# Patient Record
Sex: FEMALE | Race: WHITE | URBAN - METROPOLITAN AREA
[De-identification: names, ages, dates, MRNs, and addresses within clinical notes are randomized per-mention and may not be internally consistent; named-entity substitution may affect disease eponyms.]

---

## 2018-05-01 ENCOUNTER — APPOINTMENT (OUTPATIENT)
Dept: LAB | Facility: CLINIC | Age: 33
End: 2018-05-01
Attending: OBSTETRICS & GYNECOLOGY
Payer: COMMERCIAL

## 2018-05-01 ENCOUNTER — TRANSFERRED RECORDS (OUTPATIENT)
Dept: HEALTH INFORMATION MANAGEMENT | Facility: CLINIC | Age: 33
End: 2018-05-01

## 2018-05-01 PROBLEM — R11.2 NAUSEA WITH VOMITING: Status: ACTIVE | Noted: 2018-05-01

## 2018-05-01 RX ORDER — DEXTROSE, SODIUM CHLORIDE, SODIUM LACTATE, POTASSIUM CHLORIDE, AND CALCIUM CHLORIDE 5; .6; .31; .03; .02 G/100ML; G/100ML; G/100ML; G/100ML; G/100ML
INJECTION, SOLUTION INTRAVENOUS CONTINUOUS
Status: CANCELLED
Start: 2018-05-02

## 2018-05-01 RX ORDER — ONDANSETRON 2 MG/ML
4 INJECTION INTRAMUSCULAR; INTRAVENOUS ONCE
Status: CANCELLED
Start: 2018-05-02 | End: 2018-05-02

## 2018-05-02 ENCOUNTER — HOSPITAL ENCOUNTER (OUTPATIENT)
Facility: CLINIC | Age: 33
Setting detail: SPECIMEN
Discharge: HOME | End: 2018-05-02
Attending: OBSTETRICS & GYNECOLOGY | Admitting: OBSTETRICS & GYNECOLOGY
Payer: COMMERCIAL

## 2018-05-02 ENCOUNTER — INFUSION THERAPY VISIT (OUTPATIENT)
Dept: INFUSION THERAPY | Facility: CLINIC | Age: 33
End: 2018-05-02
Attending: OBSTETRICS & GYNECOLOGY
Payer: COMMERCIAL

## 2018-05-02 VITALS — HEART RATE: 76 BPM | SYSTOLIC BLOOD PRESSURE: 127 MMHG | DIASTOLIC BLOOD PRESSURE: 80 MMHG

## 2018-05-02 DIAGNOSIS — O09.90 HIGH-RISK PREGNANCY, UNSPECIFIED TRIMESTER: Primary | ICD-10-CM

## 2018-05-02 DIAGNOSIS — K92.0 HEMATEMESIS WITH NAUSEA: ICD-10-CM

## 2018-05-02 LAB
KETONES UR STRIP-MCNC: 40 MG/DL
KETONES UR STRIP-MCNC: 5 MG/DL
KETONES UR STRIP-MCNC: NEGATIVE MG/DL

## 2018-05-02 PROCEDURE — 96361 HYDRATE IV INFUSION ADD-ON: CPT

## 2018-05-02 PROCEDURE — 96374 THER/PROPH/DIAG INJ IV PUSH: CPT

## 2018-05-02 PROCEDURE — 81003 URINALYSIS AUTO W/O SCOPE: CPT | Performed by: OBSTETRICS & GYNECOLOGY

## 2018-05-02 PROCEDURE — 25000128 H RX IP 250 OP 636: Performed by: OBSTETRICS & GYNECOLOGY

## 2018-05-02 RX ORDER — ONDANSETRON 2 MG/ML
4 INJECTION INTRAMUSCULAR; INTRAVENOUS ONCE
Start: 2018-05-02 | End: 2018-05-02

## 2018-05-02 RX ORDER — DEXTROSE, SODIUM CHLORIDE, SODIUM LACTATE, POTASSIUM CHLORIDE, AND CALCIUM CHLORIDE 5; .6; .31; .03; .02 G/100ML; G/100ML; G/100ML; G/100ML; G/100ML
1000 INJECTION, SOLUTION INTRAVENOUS CONTINUOUS
Status: DISCONTINUED | OUTPATIENT
Start: 2018-05-02 | End: 2018-05-02 | Stop reason: HOSPADM

## 2018-05-02 RX ORDER — DEXTROSE, SODIUM CHLORIDE, SODIUM LACTATE, POTASSIUM CHLORIDE, AND CALCIUM CHLORIDE 5; .6; .31; .03; .02 G/100ML; G/100ML; G/100ML; G/100ML; G/100ML
INJECTION, SOLUTION INTRAVENOUS CONTINUOUS
Start: 2018-05-02

## 2018-05-02 RX ORDER — ONDANSETRON 2 MG/ML
4 INJECTION INTRAMUSCULAR; INTRAVENOUS ONCE
Status: COMPLETED | OUTPATIENT
Start: 2018-05-02 | End: 2018-05-02

## 2018-05-02 RX ADMIN — SODIUM CHLORIDE, SODIUM LACTATE, POTASSIUM CHLORIDE, CALCIUM CHLORIDE AND DEXTROSE MONOHYDRATE 2000 ML: 5; 600; 310; 30; 20 INJECTION, SOLUTION INTRAVENOUS at 12:49

## 2018-05-02 RX ADMIN — ONDANSETRON 4 MG: 2 INJECTION INTRAMUSCULAR; INTRAVENOUS at 12:52

## 2018-05-02 NOTE — MR AVS SNAPSHOT
"              After Visit Summary   2018    Mira Riddle    MRN: [id number]           Patient Information     Date Of Birth          1985        Visit Information        Provider Department      2018 12:30 PM RH INFUSION CHAIR 1 [facility] Infusion Services        Today's Diagnoses     High-risk pregnancy, unspecified trimester    -  1    Hematemesis with nausea           Follow-ups after your visit        Who to contact     If you have questions or need follow up information about today's clinic visit or your schedule please contact  INFUSION SERVICES directly at [phone].  Normal or non-critical lab and imaging results will be communicated to you by MyFeelBackhart, letter or phone within 4 business days after the clinic has received the results. If you do not hear from us within 7 days, please contact the clinic through Privcapt or phone. If you have a critical or abnormal lab result, we will notify you by phone as soon as possible.  Submit refill requests through Yorxs or call your pharmacy and they will forward the refill request to us. Please allow 3 business days for your refill to be completed.          Additional Information About Your Visit        MyChart Information     Yorxs lets you send messages to your doctor, view your test results, renew your prescriptions, schedule appointments and more. To sign up, go to [email or web address] . Click on \"Log in\" on the left side of the screen, which will take you to the Welcome page. Then click on \"Sign up Now\" on the right side of the page.     You will be asked to enter the access code listed below, as well as some personal information. Please follow the directions to create your username and password.     Your access code is: [email or web address]  Expires: 2018  3:55 PM     Your access code will  in 90 days. If you need help or a new code, please call your [place] clinic or [phone].      "   Care EveryWhere ID     This is your Care EveryWhere ID. This could be used by other organizations to access your [place] medical records  [id number]        Your Vitals Were     Pulse                   76            Blood Pressure from Last 3 Encounters:   05/02/18 127/80   08/01/15 136/71   07/07/12 122/69    Weight from Last 3 Encounters:   07/30/15 70.3 kg (155 lb)   07/05/12 70.3 kg (155 lb)              We Performed the Following     Ketones urine     Ketones urine     Ketones urine        Primary Care Provider Fax #    [facility] [phone]       [address] [place] [address], [address]  [facility] [address]        Equal Access to Services     DAT MICHEL : Torie Phan, chichi cowan, adalgisa kaalmajosselyn painter, patric martinez. So [facility] [phone].    ATENCIÓN: Si habla español, tiene a oropeza disposición servicios gratuitos de asistencia lingüística. Llame al [phone].    We comply with applicable federal civil rights laws and [place] laws. We do not discriminate on the basis of race, color, national origin, age, disability, sex, sexual orientation, or gender identity.            Thank you!     Thank you for choosing [facility] INFUSION SERVICES  for your care. Our goal is always to provide you with excellent care. Hearing back from our patients is one way we can continue to improve our services. Please take a few minutes to complete the written survey that you may receive in the mail after your visit with us. Thank you!             Your Updated Medication List - Protect others around you: Learn how to safely use, store and throw away your medicines at www.disposemymeds.org.          This list is accurate as of 5/2/18  3:55 PM.  Always use your most recent med list.                   Brand Name Dispense Instructions for use Diagnosis    labetalol 100 MG tablet    NORMODYNE    40 tablet    Take 1 tablet (100 mg) by mouth every 12 hours     Supervision of high-risk pregnancy       prenatal multivitamin plus iron 27-0.8 MG Tabs per tablet      Take 1 tablet by mouth daily

## 2018-05-02 NOTE — PROGRESS NOTES
Infusion Nursing Note:  Mira Riddle presents today for IVF, Zofran, labs for ketones.    Patient seen by provider today: No   present during visit today: Not Applicable.    Note: Pt is approx 7 weeks pregnant.  Has been struggling with N/V for the past one and half weeks.  Can't keep much of anything down.  States she even throws up ice cubes.    Saw OB yesterday who recommended trying OTC unisom/Vit B12 for nausea control.  States she was told if that does not help, po Zofran may be prescribed.      Here today due to elevated ketones and nausea.  Ketones 40 upon arrival.  Down to 5 after 1L D5LR.  Negative after 2nd liter of D5LR.  Pt instructed to f/u with Dr Mai in 2-3 days per MD orders.       Intravenous Access:  Peripheral IV placed.    Treatment Conditions:  NA      Post Infusion Assessment:  Patient tolerated infusion without incident.  Blood return noted pre and post infusion.  Site patent and intact, free from redness, edema or discomfort.  No evidence of extravasations.  Access discontinued per protocol.    Discharge Plan:   Discharge instructions reviewed with: Patient.  Patient and/or family verbalized understanding of discharge instructions and all questions answered.  Patient discharged in stable condition accompanied by: .  Departure Mode: Ambulatory.    Susan Ackerman RN

## 2018-05-14 ENCOUNTER — HOME INFUSION (PRE-WILLOW HOME INFUSION) (OUTPATIENT)
Dept: PHARMACY | Facility: CLINIC | Age: 33
End: 2018-05-14

## 2018-05-14 LAB
CHLAMYDIA - HIM PATIENT REPORTED: NEGATIVE
HBV SURFACE AG SERPL QL IA: NEGATIVE
HIV 1+2 AB+HIV1 P24 AG SERPL QL IA: NEGATIVE
HPV ABSTRACT: NORMAL
PAP-ABSTRACT: NORMAL
RUBELLA ABY IGG: NORMAL

## 2018-05-15 ENCOUNTER — HOME INFUSION (PRE-WILLOW HOME INFUSION) (OUTPATIENT)
Dept: PHARMACY | Facility: CLINIC | Age: 33
End: 2018-05-15

## 2018-05-15 NOTE — PROGRESS NOTES
This is a recent snapshot of the patient's [facility] medical record.  For current drug dose and complete information and questions, call [phone] or In [facility] pool, fv home infusion ([id number])  CSN Number:  [id number]

## 2018-05-16 NOTE — PROGRESS NOTES
This is a recent snapshot of the patient's [facility] medical record.  For current drug dose and complete information and questions, call [phone] or In [facility] pool, fv home infusion ([id number])  CSN Number:  [id number]

## 2018-05-17 ENCOUNTER — HOME INFUSION (PRE-WILLOW HOME INFUSION) (OUTPATIENT)
Dept: PHARMACY | Facility: CLINIC | Age: 33
End: 2018-05-17

## 2018-05-18 ENCOUNTER — HOME INFUSION (PRE-WILLOW HOME INFUSION) (OUTPATIENT)
Dept: PHARMACY | Facility: CLINIC | Age: 33
End: 2018-05-18

## 2018-05-18 NOTE — PROGRESS NOTES
This is a recent snapshot of the patient's [facility] medical record.  For current drug dose and complete information and questions, call [phone] or In [facility] pool, fv home infusion ([id number])  CSN Number:  [id number]

## 2018-05-19 ENCOUNTER — HOME INFUSION (PRE-WILLOW HOME INFUSION) (OUTPATIENT)
Dept: PHARMACY | Facility: CLINIC | Age: 33
End: 2018-05-19

## 2018-05-21 ENCOUNTER — HOME INFUSION (PRE-WILLOW HOME INFUSION) (OUTPATIENT)
Dept: PHARMACY | Facility: CLINIC | Age: 33
End: 2018-05-21

## 2018-05-21 NOTE — PROGRESS NOTES
This is a recent snapshot of the patient's [facility] medical record.  For current drug dose and complete information and questions, call [phone] or In [facility] pool, fv home infusion ([id number])  CSN Number:  [id number]

## 2018-05-22 ENCOUNTER — HOME INFUSION (PRE-WILLOW HOME INFUSION) (OUTPATIENT)
Dept: PHARMACY | Facility: CLINIC | Age: 33
End: 2018-05-22

## 2018-05-22 NOTE — PROGRESS NOTES
This is a recent snapshot of the patient's [facility] medical record.  For current drug dose and complete information and questions, call [phone] or In [facility] pool, fv home infusion ([id number])  CSN Number:  [id number]

## 2018-05-23 ENCOUNTER — HOME INFUSION (PRE-WILLOW HOME INFUSION) (OUTPATIENT)
Dept: PHARMACY | Facility: CLINIC | Age: 33
End: 2018-05-23

## 2018-05-23 NOTE — PROGRESS NOTES
This is a recent snapshot of the patient's [facility] medical record.  For current drug dose and complete information and questions, call [phone] or In [facility] pool, fv home infusion ([id number])  CSN Number:  [id number]

## 2018-05-25 ENCOUNTER — HOME INFUSION (PRE-WILLOW HOME INFUSION) (OUTPATIENT)
Dept: PHARMACY | Facility: CLINIC | Age: 33
End: 2018-05-25

## 2018-05-25 NOTE — PROGRESS NOTES
This is a recent snapshot of the patient's [facility] medical record.  For current drug dose and complete information and questions, call [phone] or In [facility] pool, fv home infusion ([id number])  CSN Number:  [id number]

## 2018-05-28 ENCOUNTER — HOME INFUSION (PRE-WILLOW HOME INFUSION) (OUTPATIENT)
Dept: PHARMACY | Facility: CLINIC | Age: 33
End: 2018-05-28

## 2018-05-29 NOTE — PROGRESS NOTES
This is a recent snapshot of the patient's [facility] medical record.  For current drug dose and complete information and questions, call [phone] or In [facility] pool, fv home infusion ([id number])  CSN Number:  [id number]

## 2018-05-29 NOTE — PROGRESS NOTES
This is a recent snapshot of the patient's [facility] medical record.  For current drug dose and complete information and questions, call [phone] or In Basket pool, fv home infusion ([id number])  CSN Number:  [id number]

## 2018-05-31 ENCOUNTER — HOME INFUSION (PRE-WILLOW HOME INFUSION) (OUTPATIENT)
Dept: PHARMACY | Facility: CLINIC | Age: 33
End: 2018-05-31

## 2018-06-01 ENCOUNTER — APPOINTMENT (OUTPATIENT)
Dept: LAB | Facility: CLINIC | Age: 33
End: 2018-06-01
Attending: OBSTETRICS & GYNECOLOGY
Payer: COMMERCIAL

## 2018-06-01 ENCOUNTER — HOME INFUSION (PRE-WILLOW HOME INFUSION) (OUTPATIENT)
Dept: PHARMACY | Facility: CLINIC | Age: 33
End: 2018-06-01

## 2018-06-04 ENCOUNTER — HOME INFUSION (PRE-WILLOW HOME INFUSION) (OUTPATIENT)
Dept: PHARMACY | Facility: CLINIC | Age: 33
End: 2018-06-04

## 2018-06-04 NOTE — PROGRESS NOTES
This is a recent snapshot of the patient's [facility] medical record.  For current drug dose and complete information and questions, call [phone] or In [facility] pool, fv home infusion ([id number])  CSN Number:  [id number]

## 2018-06-05 NOTE — PROGRESS NOTES
This is a recent snapshot of the patient's [facility] medical record.  For current drug dose and complete information and questions, call [phone] or In B[facility] pool, fv home infusion ([id number])  CSN Number:  [id number]

## 2018-06-08 ENCOUNTER — HOME INFUSION (PRE-WILLOW HOME INFUSION) (OUTPATIENT)
Dept: PHARMACY | Facility: CLINIC | Age: 33
End: 2018-06-08

## 2018-06-08 NOTE — PROGRESS NOTES
This is a recent snapshot of the patient's [facility] medical record.  For current drug dose and complete information and questions, call [phone] or In [facility] pool, fv home infusion ([id number])  CSN Number:  [id number]

## 2018-06-11 ENCOUNTER — HOME INFUSION (PRE-WILLOW HOME INFUSION) (OUTPATIENT)
Dept: PHARMACY | Facility: CLINIC | Age: 33
End: 2018-06-11

## 2018-06-11 NOTE — PROGRESS NOTES
This is a recent snapshot of the patient's [facility] medical record.  For current drug dose and complete information and questions, call [phone] or In [facility] pool, fv home infusion ([id number])  CSN Number:  [id number]

## 2018-06-13 ENCOUNTER — HOME INFUSION (PRE-WILLOW HOME INFUSION) (OUTPATIENT)
Dept: PHARMACY | Facility: CLINIC | Age: 33
End: 2018-06-13

## 2018-06-13 NOTE — PROGRESS NOTES
This is a recent snapshot of the patient's [facility] medical record.  For current drug dose and complete information and questions, call [phone] or In [facility] pool, fv home infusion ([id number])  CSN Number:  [id number]

## 2018-06-14 ENCOUNTER — HOME INFUSION (PRE-WILLOW HOME INFUSION) (OUTPATIENT)
Dept: PHARMACY | Facility: CLINIC | Age: 33
End: 2018-06-14

## 2018-06-14 NOTE — PROGRESS NOTES
This is a recent snapshot of the patient's [facility] medical record.  For current drug dose and complete information and questions, call [phone] or In [facility] pool, fv home infusion ([id number])  CSN Number:  [id number]

## 2018-06-18 ENCOUNTER — HOME INFUSION (PRE-WILLOW HOME INFUSION) (OUTPATIENT)
Dept: PHARMACY | Facility: CLINIC | Age: 33
End: 2018-06-18

## 2018-06-18 NOTE — PROGRESS NOTES
This is a recent snapshot of the patient's [facility] medical record.  For current drug dose and complete information and questions, call [phone] or In [facility] pool, fv home infusion ([id number])  CSN Number:  [id number]

## 2018-06-19 ENCOUNTER — HOME INFUSION (PRE-WILLOW HOME INFUSION) (OUTPATIENT)
Dept: PHARMACY | Facility: CLINIC | Age: 33
End: 2018-06-19

## 2018-06-19 ENCOUNTER — TRANSFERRED RECORDS (OUTPATIENT)
Dept: HEALTH INFORMATION MANAGEMENT | Facility: CLINIC | Age: 33
End: 2018-06-19

## 2018-06-19 LAB
ALT SERPL-CCNC: 16 IU/L (ref 12–68)
AST SERPL-CCNC: 9 IU/L (ref 12–37)
CREAT SERPL-MCNC: 0.67 MG/DL (ref 0.55–1.02)
GFR SERPL CREATININE-BSD FRML MDRD: >60 ML/MIN/1.73M2

## 2018-06-20 NOTE — PROGRESS NOTES
This is a recent snapshot of the patient's [facility] medical record.  For current drug dose and complete information and questions, call [phone] or In [facility] pool, fv home infusion ([id number])  CSN Number:  [id number]

## 2018-06-21 NOTE — PROGRESS NOTES
This is a recent snapshot of the patient's [facility] medical record.  For current drug dose and complete information and questions, call [phone] or In Basket pool, fv home infusion ([id number])  CSN Number:  [id number]

## 2018-06-22 ENCOUNTER — HOME INFUSION (PRE-WILLOW HOME INFUSION) (OUTPATIENT)
Dept: PHARMACY | Facility: CLINIC | Age: 33
End: 2018-06-22

## 2018-06-25 ENCOUNTER — HOME INFUSION (PRE-WILLOW HOME INFUSION) (OUTPATIENT)
Dept: PHARMACY | Facility: CLINIC | Age: 33
End: 2018-06-25

## 2018-06-25 NOTE — PROGRESS NOTES
This is a recent snapshot of the patient's [facility] medical record.  For current drug dose and complete information and questions, call [phone] or In [facility] pool, fv home infusion ([id number])  CSN Number:  [id number]

## 2018-06-26 NOTE — PROGRESS NOTES
This is a recent snapshot of the patient's [facility] medical record.  For current drug dose and complete information and questions, call [phone] or In B[facility] pool, fv home infusion ([id number])  CSN Number:  [id number]

## 2018-06-28 ENCOUNTER — HOME INFUSION (PRE-WILLOW HOME INFUSION) (OUTPATIENT)
Dept: PHARMACY | Facility: CLINIC | Age: 33
End: 2018-06-28

## 2018-07-01 ENCOUNTER — APPOINTMENT (OUTPATIENT)
Dept: LAB | Facility: CLINIC | Age: 33
End: 2018-07-01
Attending: OBSTETRICS & GYNECOLOGY
Payer: COMMERCIAL

## 2018-07-02 ENCOUNTER — HOME INFUSION (PRE-WILLOW HOME INFUSION) (OUTPATIENT)
Dept: PHARMACY | Facility: CLINIC | Age: 33
End: 2018-07-02

## 2018-07-02 NOTE — PROGRESS NOTES
This is a recent snapshot of the patient's [facility] medical record.  For current drug dose and complete information and questions, call [phone] or In Basket pool, fv home infusion ([id number])  CSN Number:  [id number]

## 2018-07-03 NOTE — PROGRESS NOTES
This is a recent snapshot of the patient's [facility] medical record.  For current drug dose and complete information and questions, call [phone] or In [facility] pool, fv home infusion ([id number])  CSN Number:  [id number]

## 2018-07-06 ENCOUNTER — HOME INFUSION (PRE-WILLOW HOME INFUSION) (OUTPATIENT)
Dept: PHARMACY | Facility: CLINIC | Age: 33
End: 2018-07-06

## 2018-07-07 ENCOUNTER — HOME INFUSION (PRE-WILLOW HOME INFUSION) (OUTPATIENT)
Dept: PHARMACY | Facility: CLINIC | Age: 33
End: 2018-07-07

## 2018-07-09 NOTE — PROGRESS NOTES
This is a recent snapshot of the patient's [facility] medical record.  For current drug dose and complete information and questions, call [phone] or In Basket pool, fv home infusion ([id number])  CSN Number:  [id number]

## 2018-07-10 ENCOUNTER — HOME INFUSION (PRE-WILLOW HOME INFUSION) (OUTPATIENT)
Dept: PHARMACY | Facility: CLINIC | Age: 33
End: 2018-07-10

## 2018-07-10 NOTE — PROGRESS NOTES
This is a recent snapshot of the patient's [facility] medical record.  For current drug dose and complete information and questions, call [phone] or In [facility] pool, fv home infusion ([id number])  CSN Number:  [id number]

## 2018-07-11 ENCOUNTER — HOME INFUSION (PRE-WILLOW HOME INFUSION) (OUTPATIENT)
Dept: PHARMACY | Facility: CLINIC | Age: 33
End: 2018-07-11

## 2018-07-11 NOTE — PROGRESS NOTES
This is a recent snapshot of the patient's [facility] medical record.  For current drug dose and complete information and questions, call [phone] or In B[facility] pool, fv home infusion ([id number])  CSN Number:  [id number]

## 2018-07-12 NOTE — PROGRESS NOTES
This is a recent snapshot of the patient's [facility] medical record.  For current drug dose and complete information and questions, call [phone] or In Basket pool, fv home infusion ([id number])  CSN Number:  [id number]

## 2018-07-20 ENCOUNTER — PRE VISIT (OUTPATIENT)
Dept: MATERNAL FETAL MEDICINE | Facility: CLINIC | Age: 33
End: 2018-07-20

## 2018-07-25 ENCOUNTER — OFFICE VISIT (OUTPATIENT)
Dept: MATERNAL FETAL MEDICINE | Facility: CLINIC | Age: 33
End: 2018-07-25
Attending: NURSE PRACTITIONER
Payer: COMMERCIAL

## 2018-07-25 ENCOUNTER — HOSPITAL ENCOUNTER (OUTPATIENT)
Dept: ULTRASOUND IMAGING | Facility: CLINIC | Age: 33
Discharge: HOME | End: 2018-07-25
Attending: NURSE PRACTITIONER | Admitting: NURSE PRACTITIONER
Payer: COMMERCIAL

## 2018-07-25 DIAGNOSIS — O09.522 ELDERLY MULTIGRAVIDA IN SECOND TRIMESTER: Primary | ICD-10-CM

## 2018-07-25 DIAGNOSIS — O10.019 HYPERGENCY IN PREGNANCY, ESSENTIAL: ICD-10-CM

## 2018-07-25 DIAGNOSIS — O26.90 PREGNANCY RELATED CONDITION, ANTEPARTUM: ICD-10-CM

## 2018-07-25 PROCEDURE — 76811 OB US DETAILED SNGL FETUS: CPT

## 2018-07-25 PROCEDURE — 96040 ZZH GENETIC COUNSELING, EACH 30 MINUTES: CPT | Mod: ZF | Performed by: GENETIC COUNSELOR, MS

## 2018-07-25 NOTE — MR AVS SNAPSHOT
"              After Visit Summary   2018    Mira Riddle    MRN: [id number]           Patient Information     Date Of Birth          1985        Visit Information        Provider Department      2018 10:00 AM Shawn Bell MD [facility] Maternal Fetal Medicine [facility]        Today's Diagnoses     Elderly multigravida in second trimester    -  1    Hypergency in pregnancy, essential           Follow-ups after your visit        Who to contact     If you have questions or need follow up information about today's clinic visit or your schedule please contact [facility] MATERNAL FETAL MEDICINE [facility] directly at [phone].  Normal or non-critical lab and imaging results will be communicated to you by Reelationhart, letter or phone within 4 business days after the clinic has received the results. If you do not hear from us within 7 days, please contact the clinic through Reelationhart or phone. If you have a critical or abnormal lab result, we will notify you by phone as soon as possible.  Submit refill requests through AudiBell Designs or call your pharmacy and they will forward the refill request to us. Please allow 3 business days for your refill to be completed.          Additional Information About Your Visit        MyChart Information     AudiBell Designs lets you send messages to your doctor, view your test results, renew your prescriptions, schedule appointments and more. To sign up, go to [email or web address] . Click on \"Log in\" on the left side of the screen, which will take you to the Welcome page. Then click on \"Sign up Now\" on the right side of the page.     You will be asked to enter the access code listed below, as well as some personal information. Please follow the directions to create your username and password.     Your access code is: [email or web address]  Expires: 2018  3:55 PM     Your access code will  in 90 days. If you need help or a new code, please call your [place] clinic or [phone].   "      Care EveryWhere ID     This is your Care EveryWhere ID. This could be used by other organizations to access your [place] medical records  [id number]        Your Vitals Were     Last Period                   03/15/2018            Blood Pressure from Last 3 Encounters:   05/02/18 127/80   08/01/15 136/71   07/07/12 122/69    Weight from Last 3 Encounters:   07/30/15 70.3 kg (155 lb)   07/05/12 70.3 kg (155 lb)              Today, you had the following     No orders found for display       Primary Care Provider Fax #    [facility] [phone]       [address], [address]  [facility] [address]        Equal Access to Services     DAT MICHEL : Torie Phan, chichi cowan, adalgisa kaalmajosselyn painter, patric martinez. So [facility] [phone].    ATENCIÓN: Si habla español, tiene a oropeza disposición servicios gratuitos de asistencia lingüística. Llame al [phone].    We comply with applicable federal civil rights laws and [place] laws. We do not discriminate on the basis of race, color, national origin, age, disability, sex, sexual orientation, or gender identity.            Thank you!     Thank you for choosing [facility]  for your care. Our goal is always to provide you with excellent care. Hearing back from our patients is one way we can continue to improve our services. Please take a few minutes to complete the written survey that you may receive in the mail after your visit with us. Thank you!             Your Updated Medication List - Protect others around you: Learn how to safely use, store and throw away your medicines at www.disposemymeds.org.          This list is accurate as of 7/25/18 10:46 AM.  Always use your most recent med list.                   Brand Name Dispense Instructions for use Diagnosis    labetalol 100 MG tablet    NORMODYNE    40 tablet    Take 1 tablet (100 mg) by mouth every 12 hours     Supervision of high-risk pregnancy       prenatal multivitamin plus iron 27-0.8 MG Tabs per tablet      Take 1 tablet by mouth daily

## 2018-07-25 NOTE — PROGRESS NOTES
"Please see \"Imaging\" tab under \"Chart Review\" for details of today's US at the [facility].    Shawn Bell MD  Maternal-Fetal Medicine    "

## 2018-07-25 NOTE — MR AVS SNAPSHOT
After Visit Summary   7/25/2018    Mira Riddle    MRN: [id number]           Patient Information     Date Of Birth          1985        Visit Information        Provider Department      7/25/2018 8:45 AM Sarah Degroot GC [facility] Fetal [facility]        Today's Diagnoses     Pregnancy related condition, antepartum           Follow-ups after your visit        Who to contact     If you have questions or need follow up information about today's clinic visit or your schedule please contact [facility] directly at [phone].  Normal or non-critical lab and imaging results will be communicated to you by Amgenhart, letter or phone within 4 business days after the clinic has received the results. If you do not hear from us within 7 days, please contact the clinic through Tailor Made Oilt or phone. If you have a critical or abnormal lab result, we will notify you by phone as soon as possible.  Submit refill requests through Moni or call your pharmacy and they will forward the refill request to us. Please allow 3 business days for your refill to be completed.          Additional Information About Your Visit        MyChart Information     Moni gives you secure access to your electronic health record. If you see a primary care provider, you can also send messages to your care team and make appointments. If you have questions, please call your primary care clinic.  If you do not have a primary care provider, please call [phone] and they will assist you.        Care EveryWhere ID     This is your Care EveryWhere ID. This could be used by other organizations to access your [place] medical records  [id number]        Your Vitals Were     Last Period                   03/15/2018            Blood Pressure from Last 3 Encounters:   05/02/18 127/80   08/01/15 136/71   07/07/12 122/69    Weight from Last 3 Encounters:   07/30/15 70.3 kg (155 lb)   07/05/12  70.3 kg (155 lb)              We Performed the Following     MFM Genetic Counseling        Primary Care Provider Fax #    [facility] [phone]       [facility] [address], [address]  [facility] [address]        Equal Access to Services     DAT MICHEL : Hadladi rogelio ku guilleo Soomaali, waaxda luqadaha, qaybta kaalmada adeashiayada, patric laken lillianashia moran laruth anntroy martinez. So [facility] [phone].    ATENCIÓN: Si habla español, tiene a oropeza disposición servicios gratuitos de asistencia lingüística. Llame al [phone].    We comply with applicable federal civil rights laws and [place] laws. We do not discriminate on the basis of race, color, national origin, age, disability, sex, sexual orientation, or gender identity.            Thank you!     Thank you for choosing [facility]  for your care. Our goal is always to provide you with excellent care. Hearing back from our patients is one way we can continue to improve our services. Please take a few minutes to complete the written survey that you may receive in the mail after your visit with us. Thank you!             Your Updated Medication List - Protect others around you: Learn how to safely use, store and throw away your medicines at www.disposemymeds.org.          This list is accurate as of 7/25/18  1:57 PM.  Always use your most recent med list.                   Brand Name Dispense Instructions for use Diagnosis    labetalol 100 MG tablet    NORMODYNE    40 tablet    Take 1 tablet (100 mg) by mouth every 12 hours    Supervision of high-risk pregnancy       prenatal multivitamin plus iron 27-0.8 MG Tabs per tablet      Take 1 tablet by mouth daily

## 2018-07-25 NOTE — PROGRESS NOTES
[facility]  Genetic Counseling Consult    Patient:  Mira Riddle YOB: 1985   Date of Service:  18      Mira Riddle was seen at the [facility] for genetic consultation as part of her appointment for comprehensive ultrasound.  The indication for genetic counseling is advanced maternal age.       Impression/Plan:   Mira had a comprehensive (level II) ultrasound today.  Please see the ultrasound report for further details.The patient declines genetic amniocentesis and aneuploidy screening today.    Pregnancy History:   /Parity:    Age at Delivery: 33 year old  CHRIS: 2018, by Last Menstrual Period  Gestational Age: 18w6d    No significant complications or exposures were reported in the current pregnancy.    Pregnancy history  o Two full term vaginal deliveries    Medical History:   Mira s reported medical history is significant for chronic hypertension. She reported that she discontinued Labetelol in 2018 when she had hyperemesis. She has not restarted this medication since April.       Family History:   A three-generation pedigree was obtained, and is scanned under the  Media  tab.   The following significant findings were reported by Mira:    Mira's partner, Eric, reported that his brother has a son with 22q deletion syndrome. Their nephew has learning disabilities and speech delay. They do not report any history of congenital heart defect, cleft palate or other congenital anomalies. Eric is uncertain if his brother and sister-in-law had follow-up testing for 22q deletion after his nephew was diagnosed.    This genetic condition can present with congenital heart defects and palate abnormalities prenatally. Other clinical features of 22q deletion syndrome include renal abnormalities, cognitive delays, immune deficiency, difficulty feeding, hypocalcemia, thrombocytopenia, hearing  loss, short stature and an increased risk for mental health disorders. Most (90%) individuals with 22q deletion syndrome are de mayra (new mutation, no family history). Approximately 10% of individuals with 22q deletion syndrome have an affected parent. This condition is associated with variable expressivity and some individual with the deletion may have a mild presentation. More information regarding parental testing would help clarify any potential risks for other family members.    Otherwise, the reported family history is negative for multiple miscarriages, stillbirths, birth defects, mental retardation, known genetic conditions, and consanguinity.       Carrier Screening:   The patient reports that she and the father of the pregnancy have  ancestry:     Cystic fibrosis is an autosomal recessive genetic condition that occurs with increased frequency in individuals of  ancestry and carrier screening for this condition is available.  In addition,  screening in the [facility] includes cystic fibrosis.    Expanded carrier screening for mutations in a large panel of genes associated with autosomal recessive conditions including cystic fibrosis, spinal muscular atrophy, and others, is now available.    The patient has declined the carrier screening options reviewed today.       Risk Assessment for Chromosome Conditions:   We explained that the risk for fetal chromosome abnormalities increases with maternal age. We discussed specific features of common chromosome abnormalities, including Down syndrome, trisomy 13, trisomy 18, and sex chromosome trisomies.      At age 33 at delivery, the midtrimester risk to have a baby with Down syndrome is 1 in 274.     At age 33 at delivery, the midtrimester risk to have a baby with any chromosome abnormality is 1 in 135.     Mira did not have maternal serum screening earlier in pregnancy.       Testing Options:   We discussed the following  options:  Comprehensive (Level II) ultrasound: Detailed ultrasound performed between 18-22 weeks gestation to screen for major birth defects and markers for aneuploidy.     Non-invasive Prenatal Testing (NIPT)    Maternal plasma cell-free DNA testing; first trimester ultrasound with nuchal translucency and nasal bone assessment is recommended, when appropriate    Screens for fetal trisomy 21, trisomy 13, trisomy 18, and sex chromosome aneuploidy    Cannot screen for open neural tube defects; maternal serum AFP after 15 weeks is recommended    Genetic Amniocentesis    Invasive procedure typically performed in the second trimester by which amniotic fluid is obtained for the purpose of chromosome analysis and/or other prenatal genetic analysis    Diagnostic results; >99% sensitivity for fetal chromosome abnormalities    AFAFP measurement tests for open neural tube defects    We reviewed the benefits and limitations of this testing.  Screening tests provide a risk assessment specific to the pregnancy for certain fetal chromosome abnormalities, but cannot definitively diagnose or exclude a fetal chromosome abnormality.  Follow-up genetic counseling and consideration of diagnostic testing is recommended with any abnormal screening result. Diagnostic tests carry inherent risks- including risk of miscarriage- that require careful consideration.  These tests can detect fetal chromosome abnormalities with greater than 99% certainty.  Results can be compromised by maternal cell contamination or mosaicism, and are limited by the resolution of cytogenetic G-banding technology.  There is no screening nor diagnostic test that can detect all forms of birth defects or mental disability.    It was a pleasure to be involved with Mira thurston care. Face-to-face time of the meeting was 30 minutes.      Sarah Degroot MS, [facility]  Maternal Fetal Medicine  [facility]  Phone:[phone]  Email:  [email or web address]

## 2018-10-08 ENCOUNTER — ALLIED HEALTH/NURSE VISIT (OUTPATIENT)
Dept: EDUCATION SERVICES | Facility: CLINIC | Age: 33
End: 2018-10-08
Payer: COMMERCIAL

## 2018-10-08 DIAGNOSIS — O24.419 GESTATIONAL DIABETES: Primary | ICD-10-CM

## 2018-10-08 PROCEDURE — G0109 DIAB MANAGE TRN IND/GROUP: HCPCS

## 2018-10-08 RX ORDER — LANCETS
EACH MISCELLANEOUS
Qty: 102 EACH | Refills: 3 | Status: SHIPPED | OUTPATIENT
Start: 2018-10-08

## 2018-10-08 NOTE — MR AVS SNAPSHOT
After Visit Summary   10/8/2018    Mira Riddle    MRN: [id number]           Patient Information     Date Of Birth          1985        Visit Information        Provider Department      10/8/2018 8:30 AM RI GDM [facility]        Today's Diagnoses     Gestational diabetes    -  1       Follow-ups after your visit        Your next 10 appointments already scheduled     Oct 16, 2018  2:30 PM CDT   Gestational Diabetes Education with [place] DIABETIC ED RESOURCE   [facility] ([facility])    [address]  [facility] [address] [phone]              Who to contact     If you have questions or need follow up information about today's clinic visit or your schedule please contact [place] [facility] directly at [phone].  Normal or non-critical lab and imaging results will be communicated to you by MyChart, letter or phone within 4 business days after the clinic has received the results. If you do not hear from us within 7 days, please contact the clinic through MyChart or phone. If you have a critical or abnormal lab result, we will notify you by phone as soon as possible.  Submit refill requests through RSens or call your pharmacy and they will forward the refill request to us. Please allow 3 business days for your refill to be completed.          Additional Information About Your Visit        EpiGaNhart Information     RSens gives you secure access to your electronic health record. If you see a primary care provider, you can also send messages to your care team and make appointments. If you have questions, please call your primary care clinic.  If you do not have a primary care provider, please call [phone] and they will assist you.        Care EveryWhere ID     This is your Care EveryWhere ID. This could be used by other organizations to access your [facility]  records  [id number]        Your Vitals Were     Last Period                   03/15/2018            Blood Pressure from Last 3 Encounters:   05/02/18 127/80   08/01/15 136/71   07/07/12 122/69    Weight from Last 3 Encounters:   07/30/15 70.3 kg (155 lb)   07/05/12 70.3 kg (155 lb)              We Performed the Following     DIABETES EDUCATION - Group []      LAB RESULT - HIM SCAN          Today's Medication Changes          These changes are accurate as of 10/8/18 11:47 AM.  If you have any questions, ask your nurse or doctor.               Start taking these medicines.        Dose/Directions    acetone (urine) test strip   Commonly known as:  KETOSTIX   Used for:  Gestational diabetes        Test once daily   Quantity:  25 each   Refills:  1       blood glucose monitoring lancets   Used for:  Gestational diabetes        Use to test blood sugar 4 times daily or as directed.   Quantity:  102 each   Refills:  3       blood glucose monitoring test strip   Commonly known as:  ACCU-CHEK GUIDE   Used for:  Gestational diabetes        Use to test blood sugar 4 times daily or as directed.   Quantity:  50 each   Refills:  6            Where to get your medicines      These medications were sent to [place] [address] [place] [facility], [place] - [address]  [address]  [place] , [facility] [address]     Phone:  [phone]     acetone (urine) test strip    blood glucose monitoring lancets    blood glucose monitoring test strip                Primary Care Provider Fax #    [facility] [phone]       [address] [place] [address], [address]  [facility] [address]        Equal Access to Services     ROSANNE MICHEL AH: Hadii rogelio espino Soscott, waaxda luqadaha, qaybta kaalmada adeegyada, waxjesse pastora lyles adeashia martinez. So [facility] [phone].    ATENCIÓN: Si habla español, tiene a oropeza disposición servicios gratuitos de asistencia lingüística. Llame al [phone].    We comply with applicable [facility] civil  rights laws and [place] laws. We do not discriminate on the basis of race, color, national origin, age, disability, sex, sexual orientation, or gender identity.            Thank you!     Thank you for choosing [place] [facility]  for your care. Our goal is always to provide you with excellent care. Hearing back from our patients is one way we can continue to improve our services. Please take a few minutes to complete the written survey that you may receive in the mail after your visit with us. Thank you!             Your Updated Medication List - Protect others around you: Learn how to safely use, store and throw away your medicines at www.disposemymeds.org.          This list is accurate as of 10/8/18 11:47 AM.  Always use your most recent med list.                   Brand Name Dispense Instructions for use Diagnosis    acetone (urine) test strip    KETOSTIX    25 each    Test once daily    Gestational diabetes       blood glucose monitoring lancets     102 each    Use to test blood sugar 4 times daily or as directed.    Gestational diabetes       blood glucose monitoring test strip    ACCU-CHEK GUIDE    50 each    Use to test blood sugar 4 times daily or as directed.    Gestational diabetes       labetalol 100 MG tablet    NORMODYNE    40 tablet    Take 1 tablet (100 mg) by mouth every 12 hours    Supervision of high-risk pregnancy       prenatal multivitamin plus iron 27-0.8 MG Tabs per tablet      Take 1 tablet by mouth daily

## 2018-10-08 NOTE — PROGRESS NOTES
Diabetes Self-Management Education & Support    Gestational Diabetes Self-Management Education & Support    SUBJECTIVE/OBJECTIVE:  Presents for education related to gestational diabetes.    Accompanied by: Self  Diabetes management related comments/concerns: (P) Sugar vs carb intake     Cultural Influences/Ethnic Background:  American    Estimated Date of Delivery: Dec 20, 2018    1 hour OGTT  No results found for: GLU1    3 hour OGTT    Fasting  Lab Results   Component Value Date    GLF 78 2015       1 hour  Lab Results   Component Value Date    GL1 161 2015       2 hour  Lab Results   Component Value Date    GL2 138 2015       3 hour  Lab Results   Component Value Date    GL3 143 (H) 2015       Lifestyle and Health Behaviors:  Pre-pregnancy weight (lbs): (P) 137  Barrier to exercise: (P) Time, Physical limitation  Meals include: (P) Breakfast, Lunch, Dinner, Snacks  Beverages: (P) Water, Diet soda, Soda  Cultural/Muslim diet restrictions?: (P) No  Biggest challenges to healthy eating: (P) Eating out  Pre-akila vitamin?: (P) Yes  Supplements?: (P) Yes  Experiencing nausea?: (P) No    Healthy Coping:  Emotional response to diabetes: Ready to learn  Informal Support system:: (P) Children, Family, Friends, Parent, Spouse  Stage of change: PREPARATION (Decided to change - considering how)    Current Management:  Taking medications for gestational diabetes?: No    ASSESSMENT:  Needs assistance with meal planning and BG testing    INTERVENTION:  Patient was instructed on Accu-Chek Guide meter and was able to provide an accurate return demonstration. Patient's blood glucose reading today was 155 mg/dL.    Educational topics covered today:  GDM diagnosis, pathophysiology, Risks and Complications of GDM, Means of controlling GDM, Using a Blood Glucose Monitor, Blood Glucose Goals, Logging and Interpreting Glucose Results, Ketone Testing, When to Call a Diabetes Educator or OB Provider, Healthy  Eating During Pregnancy, Counting Carbohydrates, Meal Planning for GDM, and Physical Activity    Educational materials provided today:   [facility] Understanding Gestational Diabetes  GDM Log Book  Sharps Disposal  Care After Delivery  Accu-Chek Guide meter kit    Pt verbalized understanding of concepts discussed and recommendations provided today.     PLAN:  Check glucose 4 times daily, before breakfast and 1 hour after each meal.     Check Ketones daily for one week, if negative, reduce testing to once a week.     Physical activity recommended: as able.    Meal plan: 2-3 carbs at breakfast, 3-4 carbs at lunch, 3-4 carbs at supper, 1-2 carbs at 3 snacks a day.  Follow consistent CHO meal plan, eat CHO and protein/fat at all meals/snacks.    Call/e-mail/MyChart message diabetes educator if 3 or more blood sugars are above the goal in 1 week, if ketones are positive, or with questions/concerns.    KATHIE Mojica CDE  Time Spent: 120 minutes  Encounter Type: Group class    Any diabetes medication dose changes were made via the CDE Protocol and Collaborative Practice Agreement with the patient's OB/GYN provider. A copy of this encounter was shared with the provider.

## 2018-10-10 ENCOUNTER — PATIENT OUTREACH (OUTPATIENT)
Dept: EDUCATION SERVICES | Facility: CLINIC | Age: 33
End: 2018-10-10

## 2018-10-10 NOTE — PROGRESS NOTES
Gestational Diabetes Follow-up    Subjective/Objective:    Mira Riddle sent in blood glucose log for review. Last date of communication was: 10/8/18.    Gestational diabetes is being managed with diet    Taking diabetes medications: no    Estimated Date of Delivery: Dec 20, 2018    BG/Food Log:       Hello! I am in need of some advice for my breakfast. I have high numbers in the morning after my breakfast reading. All other numbers are normal.   I eat cereal in the morning (I'm a big cereal fan!). I do not have time really for anything else. Would a Nutragrain bar be better? Or granola?   The rest of my diet is pretty low carb, unless i'm heading out for pasta or something like that. Please see the picture attached of my numbers for yesterday and then this morning.   I am only on day 2, and there is a learning curve, but thought I would see if there is anything you can suggest :)  Thank you!  Mira Riddle  1985    Assessment:    Ketones: trace.   Fasting blood glucoses: 100% in target.  After breakfast: 0% in target.  After lunch: 100% in target.  After dinner: 100% in target.    Plan/Response:  Follow up scheduled 10/16.     Response sent to patient:  Howard Meyers,    Thank you for reaching out!     For breakfast with gestational diabetes we do not generally recommend cereal (including oatmeal) or fruit. These foods tend to spike women s blood sugars.  Your insulin resistance (in pregnancy) is the highest in the morning which is why this can happen.  Since you love cereal you could always have cereal for your lunch or dinner as another option. Some ideas for breakfast would be a granola bar (one that is between 30-45 grams of carb for the bar) and hard boiled egg.  Another option would be peanut butter toast (could have 2 slices) with a glass of milk (or could substitute avocado toast instead of peanut butter) or a greek yogurt and 1 slice toast. Some ladies enjoy a breakfast sandwich with an English  muffin (all the carbs you really need at breakfast but if you want a 8 oz glass of milk too you could add this as well) and then the egg, montgomery/sausage, cheese.  Or you could try a breakfast burrito.   I always like these. You can use soft shell tortillas (6 in size) and then scramble some eggs and veggies and put them in the shell, and then top with cheese or sour cream or salsa (or all of those if you like!).     Let us know if you have any other questions! Otherwise, let s see how changing up your breakfast helps your numbers. I would say if you have another 3 or more high numbers this week to reach out to us again via email. Otherwise, it looks like your follow up is on 10/16.     I forgot to mention something!    Since you don t have much time in the morning you can always buy premade breakfast sandwiches or you could make some ahead of time on your own and freeze them. Similarly you can make breakfast burritos ahead of time and then freeze them individually and then just wrap them in a paper towel and place in your microwave to heat J    Hope some of these help for you!!    Amanda Norman, RD LD CDE

## 2018-10-10 NOTE — PROGRESS NOTES
Response from pt:    Thank you very much for the great advice. I am heading to the store after work today. I think I am going to try freezing some breakfast burritos! I like the idea of the pre made breafast sandwhiches that you buy in the freezer, but I'm prone to high blood pressure (my blood pressure actually dropped when I got pregnant, weird!) so I try to stay away from frozen food due to the sodium. Thanks again and if I keep seeing high morning numbers (or numbers in general) I will reach out again. See you Tuesday!  Mira Riddle  1985    E-mail from CDE    Happy to help! And good call on avoiding the higher sodium prepared foods with any elevated blood pressure.      Certainly let us know if you think of any other questions and happy grocery shopping BALDO Norman, KATHIE DELGADILLO CDE

## 2018-10-16 ENCOUNTER — ALLIED HEALTH/NURSE VISIT (OUTPATIENT)
Dept: EDUCATION SERVICES | Facility: CLINIC | Age: 33
End: 2018-10-16
Payer: COMMERCIAL

## 2018-10-16 DIAGNOSIS — O24.419 GESTATIONAL DIABETES: Primary | ICD-10-CM

## 2018-10-16 PROCEDURE — G0108 DIAB MANAGE TRN  PER INDIV: HCPCS

## 2018-10-16 NOTE — MR AVS SNAPSHOT
After Visit Summary   10/16/2018    Mira Riddle    MRN: [id number]           Patient Information     Date Of Birth          1985        Visit Information        Provider Department      10/16/2018 2:30 PM [place] DIABETIC ED RESOURCE [facility]        Today's Diagnoses     Gestational diabetes    -  1       Follow-ups after your visit        Who to contact     If you have questions or need follow up information about today's clinic visit or your schedule please contact [place] [facility] directly at [phone].  Normal or non-critical lab and imaging results will be communicated to you by SPOhart, letter or phone within 4 business days after the clinic has received the results. If you do not hear from us within 7 days, please contact the clinic through RSI ([employer]t or phone. If you have a critical or abnormal lab result, we will notify you by phone as soon as possible.  Submit refill requests through [email or web address] or call your pharmacy and they will forward the refill request to us. Please allow 3 business days for your refill to be completed.          Additional Information About Your Visit        MyChart Information     [email or web address] gives you secure access to your electronic health record. If you see a primary care provider, you can also send messages to your care team and make appointments. If you have questions, please call your primary care clinic.  If you do not have a primary care provider, please call [phone] and they will assist you.        Care EveryWhere ID     This is your Care EveryWhere ID. This could be used by other organizations to access your [facility] medical records  [id number]        Your Vitals Were     Last Period                   03/15/2018            Blood Pressure from Last 3 Encounters:   05/02/18 127/80   08/01/15 136/71   07/07/12 122/69    Weight from Last 3 Encounters:   07/30/15 70.3 kg (155 lb)   07/05/12 70.3 kg (155 lb)               We Performed the Following     DIABETES EDUCATION - Individual  []        Primary Care Provider Fax #    [facility] [phone]       [facility] [address], [address]  [facility] [address]        Equal Access to Services     DAT MICHEL : Torie de león guilleo Sosantoali, waaxda luqadaha, qaybta kaalmada adeashiayada, patric moran adalid martinez. So [facility] [phone].    ATENCIÓN: Si habla español, tiene a oropeza disposición servicios gratuitos de asistencia lingüística. Llame al [phone].    We comply with applicable federal civil rights laws and [place] laws. We do not discriminate on the basis of race, color, national origin, age, disability, sex, sexual orientation, or gender identity.            Thank you!     Thank you for choosing [place] [facility]  for your care. Our goal is always to provide you with excellent care. Hearing back from our patients is one way we can continue to improve our services. Please take a few minutes to complete the written survey that you may receive in the mail after your visit with us. Thank you!             Your Updated Medication List - Protect others around you: Learn how to safely use, store and throw away your medicines at www.disposemymeds.org.          This list is accurate as of 10/16/18  3:14 PM.  Always use your most recent med list.                   Brand Name Dispense Instructions for use Diagnosis    acetone (urine) test strip    KETOSTIX    25 each    Test once daily    Gestational diabetes       blood glucose monitoring lancets     102 each    Use to test blood sugar 4 times daily or as directed.    Gestational diabetes       blood glucose monitoring test strip    ACCU-CHEK GUIDE    50 each    Use to test blood sugar 4 times daily or as directed.    Gestational diabetes       labetalol 100 MG tablet    NORMODYNE    40 tablet    Take 1 tablet (100 mg) by mouth every 12 hours    Supervision of high-risk pregnancy        prenatal multivitamin plus iron 27-0.8 MG Tabs per tablet      Take 1 tablet by mouth daily

## 2018-10-16 NOTE — PROGRESS NOTES
Diabetes Self-Management Education & Support  Gestational Diabetes Self-Management Education & Support    SUBJECTIVE/OBJECTIVE:  Presents for education related to gestational diabetes.    Accompanied by: Self  Diabetes management related comments/concerns: Sugar vs carb intake     Cultural Influences/Ethnic Background:  American    LMP 03/15/2018      Estimated Date of Delivery: Dec 20, 2018    Blood Glucose/Ketone Log:       Date Fasting blood sugar 1 hour post brkfst 1 hour post lunch 1 hour post dinner   10/10 91 173 114 105   10/11 86 112* 142 128   10/12 79 132 128 155   10/13 88 134 116 119   10/14  138 154 92   10/15 88 112 103 126   10/16 85 139 114        Lifestyle and Health Behaviors:  Exercise:: Unable to exercise  Barrier to exercise: Time, Physical limitation  Meals include: Breakfast, Lunch, Dinner, Snacks  Beverages: Water, Diet soda, Soda  Cultural/Muslim diet restrictions?: No  Biggest challenges to healthy eating: Lack of time    Healthy Coping:  Emotional response to diabetes: Ready to learn  Informal Support system:: Children, Family, Friends, Parent, Spouse  Stage of change: PREPARATION (Decided to change - considering how)    Current Management:  Taking medications for gestational diabetes?: No  Difficulty affording diabetes management supplies?: No    ASSESSMENT:  Ketones: trace.   Fasting blood glucoses: 100% in target.  After breakfast: 85% in target.  After lunch: 85% in target.  After dinner: 100% in target.    Is following the meal plan, though at times eating only cheese sticks for between meal snacks. Is having some trouble getting enough carbohydrate at meals, as she does not like yogurt or fruit. Suggested milk at her meals, or having some trail mix for a sweet treat after the meal if the carbohydrate content is low.     INTERVENTION:  Educational topics covered today:  What to expect after delivery, Future testing for Type 2 diabetes (2 hour OGTT at 6 week post-partum check-up  and annual fasting blood glucose level), Risk of GDM and planning ahead for future pregnancies, Recommended lifestyle interventions for reducing the risk of Type 2 Diabetes, When to Call a Diabetes Educator or OB Provider    Educational Materials provided today:  [employer] Preventing Diabetes    PLAN:  Check glucose 4 times daily.  Check ketones once a week when readings are consistently negative.  Continue with recommended physical activity.  Continue to follow recommended meal plan: 2-3 carbs at breakfast, 3-4 carbs at lunch, 3-4 carbs at supper, 1-2 carbs at snacks.  Follow consistent CHO meal plan, eat CHO and protein/fat at all meals/snacks.    Call/e-mail/MyWishBoardhart message diabetes educator if 3 or more blood sugars are above the goal in 1 week or if ketones are positive.    KATHIE Mojica CDE  Time Spent: 30 minutes  Encounter Type: Individual    Any diabetes medication dose changes were made via the CDE Protocol and Collaborative Practice Agreement with the patient's OB/GYN provider. A copy of this encounter was shared with the provider.

## 2018-10-24 ENCOUNTER — PATIENT OUTREACH (OUTPATIENT)
Dept: EDUCATION SERVICES | Facility: CLINIC | Age: 33
End: 2018-10-24

## 2018-10-24 NOTE — PROGRESS NOTES
Gestational Diabetes Follow-up    Subjective/Objective:    Mira BLAND Janessa sent in blood glucose log for review. Last date of communication was: 10/16/18.    Gestational diabetes is being managed with diet and activity    Taking diabetes medications: no    Estimated Date of Delivery: Dec 20, 2018    BG/Food Log:   Goid morning! My weekly check in. Please see attached photo of my log. I am still having some trouble with the Negative and Trace Ketones.  I can't tell on the strip sometimes.   Thank you   Mira Riddle   1985      Assessment:    Ketones: trace/neg?.   Fasting blood glucoses: 100% in target.  After breakfast: 87% in target.  After lunch: 87% in target.  After dinner: 87% in target.    Plan/Response:  Follow-up in 1 week.    Howard Meyers,    Thanks for the updates! I would think that you can reduce your testing to once weekly now for the ketone strips, as long as your weight has been maintained/slowly gaining since you were diagnosed. As for your blood sugars, they overall look great! Let s have you check in with us again next Wednesday for another review (or sooner if 3 or more numbers are elevated before then).    Have a great week! BALDO De La Torre RD LD CDE    Any diabetes medication dose changes were made via the CDE Protocol and Collaborative Practice Agreement with the patient's OB/GYN provider. A copy of this encounter was shared with the provider.

## 2018-11-01 ENCOUNTER — PATIENT OUTREACH (OUTPATIENT)
Dept: EDUCATION SERVICES | Facility: CLINIC | Age: 33
End: 2018-11-01

## 2018-11-01 DIAGNOSIS — O24.410 DIET CONTROLLED GESTATIONAL DIABETES MELLITUS (GDM) IN THIRD TRIMESTER: Primary | ICD-10-CM

## 2018-11-01 NOTE — PROGRESS NOTES
Gestational Diabetes Follow-up    Subjective/Objective:    Mira Riddle sent in blood glucose log for review. Last date of communication was: 10/24.    Gestational diabetes is being managed with diet and activity    Taking diabetes medications: no    Estimated Date of Delivery: Dec 20, 2018    BG/Food Log:         Assessment:    Ketones: T.   Fasting blood glucoses: 100% in target.  After breakfast: 78% in target.  After lunch: 88% in target.  After dinner: 67% in target.    Plan/Response:  No changes in the patient's current treatment plan.  CDE reply:  Howard Meyers,   Looking good! How about that? Nearly all the numbers are in goal, even mornings which tend to be the trickiest ;)    Way to go! It looks like your body is good at showing if it doesn t like something, like juice or cereal J   Those are both foods that just don t work for most moms, not a surprise!    Please keep up the good work and send in again next Wednesday, Nov. 7, to check in.   Thanks!    Corina Sellers RD, LD, CDE      Any diabetes medication dose changes were made via the CDE Protocol and Collaborative Practice Agreement with the patient's OB/GYN provider.

## 2018-11-08 ENCOUNTER — PATIENT OUTREACH (OUTPATIENT)
Dept: EDUCATION SERVICES | Facility: CLINIC | Age: 33
End: 2018-11-08

## 2018-11-08 DIAGNOSIS — O24.410 DIET CONTROLLED GESTATIONAL DIABETES MELLITUS (GDM) IN THIRD TRIMESTER: Primary | ICD-10-CM

## 2018-11-09 NOTE — PROGRESS NOTES
Gestational Diabetes Follow-up    Subjective/Objective:    Mira Riddle sent in blood glucose log for review. Last date of communication was: 11/01.    Gestational diabetes is being managed with diet and activity    Taking diabetes medications: no    Estimated Date of Delivery: Dec 20, 2018    BG/Food Log:         Assessment:    Ketones: N-T.   Fasting blood glucoses: 100% in target.  After breakfast: 80% in target.  After lunch: 80% in target.  After dinner: 100% in target.    Plan/Response:  No changes in the patient's current treatment plan.    CDE reply:      Corina Sellers RD, LD, CDE      Any diabetes medication dose changes were made via the CDE Protocol and Collaborative Practice Agreement with the patient's OB/GYN provider.

## 2018-11-16 ENCOUNTER — PATIENT OUTREACH (OUTPATIENT)
Dept: EDUCATION SERVICES | Facility: CLINIC | Age: 33
End: 2018-11-16

## 2018-11-16 DIAGNOSIS — O24.410 DIET CONTROLLED GESTATIONAL DIABETES MELLITUS (GDM) IN THIRD TRIMESTER: Primary | ICD-10-CM

## 2018-11-16 NOTE — PROGRESS NOTES
Gestational Diabetes Follow-up    Subjective/Objective:    Mira Riddle sent in blood glucose log for review. Last date of communication was: 11/8.    Gestational diabetes is being managed with diet and activity    Taking diabetes medications: no    Estimated Date of Delivery: Dec 20, 2018    BG/Food Log:         Assessment:    Ketones: N.   Fasting blood glucoses: 100% in target.  After breakfast: 78% in target.  After lunch: 100% in target.  After dinner: 88% in target.    Plan/Response:  No changes in the patient's current treatment plan.    CDE reply:  Hi Mira,   It all came through just fine ;)  Numbers look healthy! Maybe a little experimenting at breakfast? Couple elevations there, but really looking good!     With no concerns and numbers right on track, can you please send in on the 26th?  We ll be a little light next week with the holiday. But, if you do see 3 high numbers on a page that you can t explain, please send ASAP!    Happy Thanksgiving!    Corina Sellers RD, LD, CDE      Any diabetes medication dose changes were made via the CDE Protocol and Collaborative Practice Agreement with the patient's OB/GYN provider. A copy of this encounter was shared with the provider.

## 2018-11-20 LAB — GROUP B STREP PCR: NORMAL

## 2018-11-24 ENCOUNTER — APPOINTMENT (OUTPATIENT)
Dept: ULTRASOUND IMAGING | Facility: CLINIC | Age: 33
End: 2018-11-24
Attending: OBSTETRICS & GYNECOLOGY
Payer: COMMERCIAL

## 2018-11-24 ENCOUNTER — HOSPITAL ENCOUNTER (OUTPATIENT)
Facility: CLINIC | Age: 33
Discharge: HOME | End: 2018-11-24
Attending: OBSTETRICS & GYNECOLOGY | Admitting: OBSTETRICS & GYNECOLOGY
Payer: COMMERCIAL

## 2018-11-24 VITALS
RESPIRATION RATE: 18 BRPM | HEIGHT: 63 IN | DIASTOLIC BLOOD PRESSURE: 77 MMHG | WEIGHT: 156 LBS | TEMPERATURE: 97.8 F | BODY MASS INDEX: 27.64 KG/M2 | SYSTOLIC BLOOD PRESSURE: 126 MMHG

## 2018-11-24 LAB
ALT SERPL W P-5'-P-CCNC: 17 U/L (ref 0–50)
ANION GAP SERPL CALCULATED.3IONS-SCNC: 9 MMOL/L (ref 3–14)
AST SERPL W P-5'-P-CCNC: 16 U/L (ref 0–45)
BUN SERPL-MCNC: 9 MG/DL (ref 7–30)
CALCIUM SERPL-MCNC: 8.3 MG/DL (ref 8.5–10.1)
CHLORIDE SERPL-SCNC: 106 MMOL/L (ref 94–109)
CO2 SERPL-SCNC: 25 MMOL/L (ref 20–32)
CREAT SERPL-MCNC: 0.83 MG/DL (ref 0.52–1.04)
CREAT UR-MCNC: 125 MG/DL
ERYTHROCYTE [DISTWIDTH] IN BLOOD BY AUTOMATED COUNT: 13.2 % (ref 10–15)
GFR SERPL CREATININE-BSD FRML MDRD: 79 ML/MIN/1.7M2
GLUCOSE SERPL-MCNC: 74 MG/DL (ref 70–99)
HCT VFR BLD AUTO: 30.2 % (ref 35–47)
HGB BLD-MCNC: 9.9 G/DL (ref 11.7–15.7)
MCH RBC QN AUTO: 29.3 PG (ref 26.5–33)
MCHC RBC AUTO-ENTMCNC: 32.8 G/DL (ref 31.5–36.5)
MCV RBC AUTO: 89 FL (ref 78–100)
PLATELET # BLD AUTO: 258 10E9/L (ref 150–450)
POTASSIUM SERPL-SCNC: 4 MMOL/L (ref 3.4–5.3)
PROT UR-MCNC: 0.46 G/L
PROT/CREAT 24H UR: 0.37 G/G CR (ref 0–0.2)
RBC # BLD AUTO: 3.38 10E12/L (ref 3.8–5.2)
SODIUM SERPL-SCNC: 140 MMOL/L (ref 133–144)
URATE SERPL-MCNC: 4.4 MG/DL (ref 2.6–6)
WBC # BLD AUTO: 10.9 10E9/L (ref 4–11)

## 2018-11-24 PROCEDURE — 80048 BASIC METABOLIC PNL TOTAL CA: CPT | Performed by: OBSTETRICS & GYNECOLOGY

## 2018-11-24 PROCEDURE — 76819 FETAL BIOPHYS PROFIL W/O NST: CPT

## 2018-11-24 PROCEDURE — 59025 FETAL NON-STRESS TEST: CPT

## 2018-11-24 PROCEDURE — 84450 TRANSFERASE (AST) (SGOT): CPT | Performed by: OBSTETRICS & GYNECOLOGY

## 2018-11-24 PROCEDURE — 84460 ALANINE AMINO (ALT) (SGPT): CPT | Performed by: OBSTETRICS & GYNECOLOGY

## 2018-11-24 PROCEDURE — 84550 ASSAY OF BLOOD/URIC ACID: CPT | Performed by: OBSTETRICS & GYNECOLOGY

## 2018-11-24 PROCEDURE — G0463 HOSPITAL OUTPT CLINIC VISIT: HCPCS | Mod: 25

## 2018-11-24 PROCEDURE — 36415 COLL VENOUS BLD VENIPUNCTURE: CPT | Performed by: OBSTETRICS & GYNECOLOGY

## 2018-11-24 PROCEDURE — 84156 ASSAY OF PROTEIN URINE: CPT | Performed by: OBSTETRICS & GYNECOLOGY

## 2018-11-24 PROCEDURE — 85027 COMPLETE CBC AUTOMATED: CPT | Performed by: OBSTETRICS & GYNECOLOGY

## 2018-11-24 RX ORDER — ONDANSETRON 2 MG/ML
4 INJECTION INTRAMUSCULAR; INTRAVENOUS EVERY 6 HOURS PRN
Status: DISCONTINUED | OUTPATIENT
Start: 2018-11-24 | End: 2018-11-24 | Stop reason: HOSPADM

## 2018-11-24 NOTE — PROVIDER NOTIFICATION
11/24/18 0904   Provider Notification   Provider Name/Title Dr Trotter   Method of Notification Phone   Notification Reason Patient Arrived   Updated MD of pt's arrival for BPP due to preeclampsia. MD stated to do BPP and if pt's BP was elevated, proceed with PIH labs and serial BPs and update with results.

## 2018-11-24 NOTE — IP AVS SNAPSHOT
MRN:[id number]                      After Visit Summary   11/24/2018    Mira Riddle    MRN: [id number]           Thank you!     Thank you for choosing [facility] for your care. Our goal is always to provide you with excellent care. Hearing back from our patients is one way we can continue to improve our services. Please take a few minutes to complete the written survey that you may receive in the mail after you visit. If you would like to speak to someone directly about your visit please contact Patient Relations at [phone]. Thank you!          Patient Information     Date Of Birth          1985        About your hospital stay     You were admitted on:  November 24, 2018 You last received care in the:  [facility] Labor and Delivery    You were discharged on:  November 24, 2018       Who to Call     For medical emergencies, please call 911.  For non-urgent questions about your medical care, please call your primary care provider or clinic, None          Attending Provider     Provider Specialty    Jessica Trotter MD OB/Gyn       Primary Care Provider Fax #    [facility] [phone]      Further instructions from your care team       Discharge Instruction for Undelivered Patients      You were seen for: BPP and Blood Pressure Evaluation  We Consulted: Dr Trotter  You had (Test or Medicine):Non-stress test, ultrasound and labwork     Diet:   To manager your diabetes, follow the guidelines for eating and drinking given to you by your Clinic Provider or Diabetes Educator.       Activity:  Count fetal kicks everyday (see handout)  Call your doctor or nurse midwife if your baby is moving less than usual.     Call your provider if you notice:  Swelling in your face or increased swelling in your hands or legs.  Headaches that are not relieved by Tylenol (acetaminophen).  Changes in your vision (blurring: seeing spots or stars.)  Nausea (sick to  "your stomach) and vomiting (throwing up).   Weight gain of 5 pounds or more per week.  Heartburn that doesn't go away.  Signs of bladder infection: pain when you urinate (use the toilet), need to go more often and more urgently.  The bag of herman (rupture of membranes) breaks, or you notice leaking in your underwear.  Bright red blood in your underwear.  Abdominal (lower belly) or stomach pain.  Second (plus) baby: Contractions (tightening) less than 10 minutes apart and getting stronger.    Follow-up:  As scheduled in the clinic          Pending Results     Date and Time Order Name Status Description    11/24/2018 0905 US Fetal Biophys Prof w/o Non Stress Test Preliminary             Admission Information     Date & Time Provider Department Dept. Phone    11/24/2018 Jessica Trotter MD [facility] Labor and Delivery [phone]      Your Vitals Were     Blood Pressure Temperature Respirations Height Weight Last Period    126/77 97.8  F (36.6  C) (Oral) 18 1.6 m (5' 3\") 70.8 kg (156 lb) 03/15/2018    BMI (Body Mass Index)                   27.63 kg/m2           MyChart Information     [facility] gives you secure access to your electronic health record. If you see a primary care provider, you can also send messages to your care team and make appointments. If you have questions, please call your primary care clinic.  If you do not have a primary care provider, please call [phone] and they will assist you.        Care EveryWhere ID     This is your Care EveryWhere ID. This could be used by other organizations to access your [place] medical records  [id number]        Equal Access to Services     [facility]: Hadii rogelio Phan, waaxda luqadaha, qaybta kaalpatric smith. So [facility] [phone].    ATENCIÓN: Si habla español, tiene a oropeza disposición servicios gratuitos de asistencia lingüística. Llame al [phone].    We comply with applicable " federal civil rights laws and [place] laws. We do not discriminate on the basis of race, color, national origin, age, disability, sex, sexual orientation, or gender identity.               Review of your medicines      UNREVIEWED medicines. Ask your doctor about these medicines        Dose / Directions    BABY ASPIRIN PO        Refills:  0       IRON SUPPLEMENT PO        Refills:  0       prenatal multivitamin plus iron 27-0.8 MG Tabs per tablet   Indication:  Pregnancy        Dose:  1 tablet   Take 1 tablet by mouth daily   Refills:  0         CONTINUE these medicines which have NOT CHANGED        Dose / Directions    acetone urine test strip   Commonly known as:  KETOSTIX   Used for:  Gestational diabetes        Test once daily   Quantity:  25 each   Refills:  1       blood glucose monitoring lancets   Used for:  Gestational diabetes        Use to test blood sugar 4 times daily or as directed.   Quantity:  102 each   Refills:  3       blood glucose monitoring test strip   Commonly known as:  ACCU-CHEK GUIDE   Used for:  Gestational diabetes        Use to test blood sugar 4 times daily or as directed.   Quantity:  50 each   Refills:  6                Protect others around you: Learn how to safely use, store and throw away your medicines at www.disposemymeds.org.             Medication List: This is a list of all your medications and when to take them. Check marks below indicate your daily home schedule. Keep this list as a reference.      Medications           Morning Afternoon Evening Bedtime As Needed    acetone urine test strip   Commonly known as:  KETOSTIX   Test once daily                                BABY ASPIRIN PO                                blood glucose monitoring lancets   Use to test blood sugar 4 times daily or as directed.                                blood glucose monitoring test strip   Commonly known as:  ACCU-CHEK GUIDE   Use to test blood sugar 4 times daily or as directed.                                 IRON SUPPLEMENT PO                                prenatal multivitamin plus iron 27-0.8 MG Tabs per tablet   Take 1 tablet by mouth daily

## 2018-11-24 NOTE — PROVIDER NOTIFICATION
11/24/18 1033   Provider Notification   Provider Name/Title DR MEDEIROS   Method of Notification Phone   Notification Reason Lab/Diagnostic Study   MD updated on 10/10 BPP, pt new q 2-7 min, however pt not feeling them, that first two BPs were elevated, then went back to normal limits, PIH labs were normal with the exception of random urine protein was 0.37, which provider and pt already knew due to labwork done last Wednesday. MD gave TO to discharge pt to home with preeclampsia and labor precautions.

## 2018-11-24 NOTE — IP AVS SNAPSHOT
[facility] Labor and Delivery    [address]    [facility] [address]    Phone:  [phone]    Fax:  [phone]                                       After Visit Summary   11/24/2018    Mira Riddle    MRN: [id number]           After Visit Summary Signature Page     I have received my discharge instructions, and my questions have been answered. I have discussed any challenges I see with this plan with the nurse or doctor.    ..........................................................................................................................................  Patient/Patient Representative Signature      ..........................................................................................................................................  Patient Representative Print Name and Relationship to Patient    ..................................................               ................................................  Date                                   Time    ..........................................................................................................................................  Reviewed by Signature/Title    ...................................................              ..............................................  Date                                               Time          22EPIC Rev 08/18

## 2018-11-24 NOTE — DISCHARGE INSTRUCTIONS
Discharge Instruction for Undelivered Patients      You were seen for: BPP and Blood Pressure Evaluation  We Consulted: Dr Trotter  You had (Test or Medicine):Non-stress test, ultrasound and labwork     Diet:   To manager your diabetes, follow the guidelines for eating and drinking given to you by your Clinic Provider or Diabetes Educator.       Activity:  Count fetal kicks everyday (see handout)  Call your doctor or nurse midwife if your baby is moving less than usual.     Call your provider if you notice:  Swelling in your face or increased swelling in your hands or legs.  Headaches that are not relieved by Tylenol (acetaminophen).  Changes in your vision (blurring: seeing spots or stars.)  Nausea (sick to your stomach) and vomiting (throwing up).   Weight gain of 5 pounds or more per week.  Heartburn that doesn't go away.  Signs of bladder infection: pain when you urinate (use the toilet), need to go more often and more urgently.  The bag of herman (rupture of membranes) breaks, or you notice leaking in your underwear.  Bright red blood in your underwear.  Abdominal (lower belly) or stomach pain.  Second (plus) baby: Contractions (tightening) less than 10 minutes apart and getting stronger.    Follow-up:  As scheduled in the clinic

## 2018-11-24 NOTE — PLAN OF CARE
Data: Patient assessed in the [facility] for scheduled BPP for preeclampsia.  Cervical exam not examined.  Membranes intact.  Contractions q 2-7 minutes, pt not feeling them.  Action:  Presumed adequate fetal oxygenation documented (see flow record). Discharge instructions reviewed.  Patient instructed to report change in fetal movement, vaginal leaking of fluid or bleeding, abdominal pain, or any concerns related to the pregnancy to her nurse/physician.    Response: Orders to discharge home per Jessica Trotter.  Patient verbalized understanding of education and verbalized agreement with plan. Discharged to home at 1044.

## 2018-11-27 ENCOUNTER — PATIENT OUTREACH (OUTPATIENT)
Dept: EDUCATION SERVICES | Facility: CLINIC | Age: 33
End: 2018-11-27

## 2018-11-27 NOTE — PROGRESS NOTES
Gestational Diabetes Follow-up    Subjective/Objective:    Mira Riddle sent in blood glucose log for review. Last date of communication was: 11/16/18.    Gestational diabetes is being managed with diet and activity    Taking diabetes medications: no    Estimated Date of Delivery: Dec 20, 2018    BG/Food Log:   My weekly update. Forgot to email yesterday. I made it through a traditional Thanksgiving lunch just fine. Ate pretty much same meal for dinner, however got busy with family and forgot to test after dinner.   I'm trying to keep things low carb without eating salad. The whole mackenzie lettuce thing has me freaked out.  I usually have like a salad a day!   Thanks! Have a good week.   Mira Riddle              Assessment:  Blood sugars in target with lifestyle plan.     Ketones: none reported.   Fasting blood glucoses: 100% in target.  After breakfast: 86% in target.  After lunch: 75% in target.  After dinner: 100% in target.    Plan/Response:  No changes in the patient's current treatment plan.  Follow-up in 2 weeks.    Sherri Nevarez MS, RD, LD, CDE      Any diabetes medication dose changes were made via the CDE Protocol and Collaborative Practice Agreement with the patient's OB/GYN provider. A copy of this encounter was shared with the provider.    E-mail reply to patient:  Howard Meyers,    Sam for sending in your readings.  They look great.  Keep up the great work you re almost done!    I agree with the lettuce scare!  I d recommend sticking to the spring mix, or mixed greens that don t contain gina.  I haven t seen a mass recall for this category yet.  Though if you re more sensitive to food poisoning then I d just stick to cooked vegetables! You could make a coleslaw or cucumber salad as an alternate too.      If you haven t had your baby in 2 weeks then send in again.      Take care!      Sherri Nevarez MS, AKTHIE, LD, CDE

## 2018-12-05 ENCOUNTER — HOSPITAL ENCOUNTER (INPATIENT)
Facility: CLINIC | Age: 33
LOS: 2 days | Discharge: HOME | End: 2018-12-07
Attending: OBSTETRICS & GYNECOLOGY | Admitting: OBSTETRICS & GYNECOLOGY
Payer: COMMERCIAL

## 2018-12-05 ENCOUNTER — ANESTHESIA (OUTPATIENT)
Dept: OBGYN | Facility: CLINIC | Age: 33
End: 2018-12-05
Payer: COMMERCIAL

## 2018-12-05 ENCOUNTER — ANESTHESIA EVENT (OUTPATIENT)
Dept: OBGYN | Facility: CLINIC | Age: 33
End: 2018-12-05
Payer: COMMERCIAL

## 2018-12-05 DIAGNOSIS — I10 BENIGN ESSENTIAL HYPERTENSION: Primary | ICD-10-CM

## 2018-12-05 LAB
ABO + RH BLD: NORMAL
ABO + RH BLD: NORMAL
ALT SERPL W P-5'-P-CCNC: 14 U/L (ref 0–50)
AST SERPL W P-5'-P-CCNC: 11 U/L (ref 0–45)
BASOPHILS # BLD AUTO: 0 10E9/L (ref 0–0.2)
BASOPHILS NFR BLD AUTO: 0.2 %
CREAT SERPL-MCNC: 0.7 MG/DL (ref 0.52–1.04)
DIFFERENTIAL METHOD BLD: ABNORMAL
EOSINOPHIL # BLD AUTO: 0 10E9/L (ref 0–0.7)
EOSINOPHIL NFR BLD AUTO: 0.4 %
ERYTHROCYTE [DISTWIDTH] IN BLOOD BY AUTOMATED COUNT: 13.7 % (ref 10–15)
GFR SERPL CREATININE-BSD FRML MDRD: >90 ML/MIN/1.7M2
GLUCOSE BLDC GLUCOMTR-MCNC: 67 MG/DL (ref 70–99)
GLUCOSE BLDC GLUCOMTR-MCNC: 76 MG/DL (ref 70–99)
GLUCOSE BLDC GLUCOMTR-MCNC: 82 MG/DL (ref 70–99)
GLUCOSE BLDC GLUCOMTR-MCNC: 84 MG/DL (ref 70–99)
HCT VFR BLD AUTO: 30.2 % (ref 35–47)
HGB BLD-MCNC: 9.9 G/DL (ref 11.7–15.7)
IMM GRANULOCYTES # BLD: 0.1 10E9/L (ref 0–0.4)
IMM GRANULOCYTES NFR BLD: 0.5 %
LYMPHOCYTES # BLD AUTO: 2.1 10E9/L (ref 0.8–5.3)
LYMPHOCYTES NFR BLD AUTO: 20.7 %
MCH RBC QN AUTO: 29.2 PG (ref 26.5–33)
MCHC RBC AUTO-ENTMCNC: 32.8 G/DL (ref 31.5–36.5)
MCV RBC AUTO: 89 FL (ref 78–100)
MONOCYTES # BLD AUTO: 0.5 10E9/L (ref 0–1.3)
MONOCYTES NFR BLD AUTO: 4.8 %
NEUTROPHILS # BLD AUTO: 7.6 10E9/L (ref 1.6–8.3)
NEUTROPHILS NFR BLD AUTO: 73.4 %
NRBC # BLD AUTO: 0 10*3/UL
NRBC BLD AUTO-RTO: 0 /100
PLATELET # BLD AUTO: 240 10E9/L (ref 150–450)
RBC # BLD AUTO: 3.39 10E12/L (ref 3.8–5.2)
SPECIMEN EXP DATE BLD: NORMAL
WBC # BLD AUTO: 10.4 10E9/L (ref 4–11)

## 2018-12-05 PROCEDURE — 27110038 ZZH RX 271

## 2018-12-05 PROCEDURE — 25000125 ZZHC RX 250: Performed by: OBSTETRICS & GYNECOLOGY

## 2018-12-05 PROCEDURE — 25000132 ZZH RX MED GY IP 250 OP 250 PS 637: Performed by: OBSTETRICS & GYNECOLOGY

## 2018-12-05 PROCEDURE — 0KQM0ZZ REPAIR PERINEUM MUSCLE, OPEN APPROACH: ICD-10-PCS | Performed by: OBSTETRICS & GYNECOLOGY

## 2018-12-05 PROCEDURE — 72200001 ZZH LABOR CARE VAGINAL DELIVERY SINGLE

## 2018-12-05 PROCEDURE — 37000011 ZZH ANESTHESIA WARD SERVICE

## 2018-12-05 PROCEDURE — 84450 TRANSFERASE (AST) (SGOT): CPT | Performed by: OBSTETRICS & GYNECOLOGY

## 2018-12-05 PROCEDURE — 84460 ALANINE AMINO (ALT) (SGPT): CPT | Performed by: OBSTETRICS & GYNECOLOGY

## 2018-12-05 PROCEDURE — 82565 ASSAY OF CREATININE: CPT | Performed by: OBSTETRICS & GYNECOLOGY

## 2018-12-05 PROCEDURE — 3E0R3BZ INTRODUCTION OF ANESTHETIC AGENT INTO SPINAL CANAL, PERCUTANEOUS APPROACH: ICD-10-PCS | Performed by: ANESTHESIOLOGY

## 2018-12-05 PROCEDURE — 25000128 H RX IP 250 OP 636: Performed by: OBSTETRICS & GYNECOLOGY

## 2018-12-05 PROCEDURE — 3E033VJ INTRODUCTION OF OTHER HORMONE INTO PERIPHERAL VEIN, PERCUTANEOUS APPROACH: ICD-10-PCS | Performed by: OBSTETRICS & GYNECOLOGY

## 2018-12-05 PROCEDURE — 86900 BLOOD TYPING SEROLOGIC ABO: CPT | Performed by: OBSTETRICS & GYNECOLOGY

## 2018-12-05 PROCEDURE — 86780 TREPONEMA PALLIDUM: CPT | Performed by: OBSTETRICS & GYNECOLOGY

## 2018-12-05 PROCEDURE — 86901 BLOOD TYPING SEROLOGIC RH(D): CPT | Performed by: OBSTETRICS & GYNECOLOGY

## 2018-12-05 PROCEDURE — 40000671 ZZH STATISTIC ANESTHESIA CASE

## 2018-12-05 PROCEDURE — 12000029 ZZH R&B OB INTERMEDIATE

## 2018-12-05 PROCEDURE — 00HU33Z INSERTION OF INFUSION DEVICE INTO SPINAL CANAL, PERCUTANEOUS APPROACH: ICD-10-PCS | Performed by: ANESTHESIOLOGY

## 2018-12-05 PROCEDURE — 25000128 H RX IP 250 OP 636

## 2018-12-05 PROCEDURE — 00000146 ZZHCL STATISTIC GLUCOSE BY METER IP

## 2018-12-05 PROCEDURE — 10907ZC DRAINAGE OF AMNIOTIC FLUID, THERAPEUTIC FROM PRODUCTS OF CONCEPTION, VIA NATURAL OR ARTIFICIAL OPENING: ICD-10-PCS | Performed by: OBSTETRICS & GYNECOLOGY

## 2018-12-05 PROCEDURE — 36415 COLL VENOUS BLD VENIPUNCTURE: CPT | Performed by: OBSTETRICS & GYNECOLOGY

## 2018-12-05 PROCEDURE — 85025 COMPLETE CBC W/AUTO DIFF WBC: CPT | Performed by: OBSTETRICS & GYNECOLOGY

## 2018-12-05 RX ORDER — OXYTOCIN 10 [USP'U]/ML
10 INJECTION, SOLUTION INTRAMUSCULAR; INTRAVENOUS
Status: DISCONTINUED | OUTPATIENT
Start: 2018-12-05 | End: 2018-12-05

## 2018-12-05 RX ORDER — OXYTOCIN/0.9 % SODIUM CHLORIDE 30/500 ML
340 PLASTIC BAG, INJECTION (ML) INTRAVENOUS CONTINUOUS PRN
Status: DISCONTINUED | OUTPATIENT
Start: 2018-12-05 | End: 2018-12-07 | Stop reason: HOSPADM

## 2018-12-05 RX ORDER — BISACODYL 10 MG
10 SUPPOSITORY, RECTAL RECTAL DAILY PRN
Status: DISCONTINUED | OUTPATIENT
Start: 2018-12-07 | End: 2018-12-07 | Stop reason: HOSPADM

## 2018-12-05 RX ORDER — AMOXICILLIN 250 MG
2 CAPSULE ORAL 2 TIMES DAILY
Status: DISCONTINUED | OUTPATIENT
Start: 2018-12-05 | End: 2018-12-07 | Stop reason: HOSPADM

## 2018-12-05 RX ORDER — AMOXICILLIN 250 MG
1 CAPSULE ORAL 2 TIMES DAILY
Status: DISCONTINUED | OUTPATIENT
Start: 2018-12-05 | End: 2018-12-07 | Stop reason: HOSPADM

## 2018-12-05 RX ORDER — BUPIVACAINE HCL/0.9 % NACL/PF 0.125 %
PLASTIC BAG, INJECTION (ML) EPIDURAL CONTINUOUS
Status: DISCONTINUED | OUTPATIENT
Start: 2018-12-05 | End: 2018-12-05

## 2018-12-05 RX ORDER — IBUPROFEN 800 MG/1
800 TABLET, FILM COATED ORAL EVERY 6 HOURS PRN
Status: DISCONTINUED | OUTPATIENT
Start: 2018-12-05 | End: 2018-12-07 | Stop reason: HOSPADM

## 2018-12-05 RX ORDER — LABETALOL 100 MG/1
100 TABLET, FILM COATED ORAL EVERY 12 HOURS SCHEDULED
Status: DISCONTINUED | OUTPATIENT
Start: 2018-12-06 | End: 2018-12-06

## 2018-12-05 RX ORDER — IBUPROFEN 800 MG/1
800 TABLET, FILM COATED ORAL
Status: DISCONTINUED | OUTPATIENT
Start: 2018-12-05 | End: 2018-12-05

## 2018-12-05 RX ORDER — NALOXONE HYDROCHLORIDE 0.4 MG/ML
.1-.4 INJECTION, SOLUTION INTRAMUSCULAR; INTRAVENOUS; SUBCUTANEOUS
Status: DISCONTINUED | OUTPATIENT
Start: 2018-12-05 | End: 2018-12-07 | Stop reason: HOSPADM

## 2018-12-05 RX ORDER — LANOLIN 100 %
OINTMENT (GRAM) TOPICAL
Status: DISCONTINUED | OUTPATIENT
Start: 2018-12-05 | End: 2018-12-07 | Stop reason: HOSPADM

## 2018-12-05 RX ORDER — OXYTOCIN 10 [USP'U]/ML
10 INJECTION, SOLUTION INTRAMUSCULAR; INTRAVENOUS
Status: DISCONTINUED | OUTPATIENT
Start: 2018-12-05 | End: 2018-12-07 | Stop reason: HOSPADM

## 2018-12-05 RX ORDER — OXYCODONE AND ACETAMINOPHEN 5; 325 MG/1; MG/1
1 TABLET ORAL
Status: DISCONTINUED | OUTPATIENT
Start: 2018-12-05 | End: 2018-12-05

## 2018-12-05 RX ORDER — NALOXONE HYDROCHLORIDE 0.4 MG/ML
.1-.4 INJECTION, SOLUTION INTRAMUSCULAR; INTRAVENOUS; SUBCUTANEOUS
Status: DISCONTINUED | OUTPATIENT
Start: 2018-12-05 | End: 2018-12-05

## 2018-12-05 RX ORDER — DEXTROSE, SODIUM CHLORIDE, SODIUM LACTATE, POTASSIUM CHLORIDE, AND CALCIUM CHLORIDE 5; .6; .31; .03; .02 G/100ML; G/100ML; G/100ML; G/100ML; G/100ML
INJECTION, SOLUTION INTRAVENOUS CONTINUOUS
Status: DISCONTINUED | OUTPATIENT
Start: 2018-12-05 | End: 2018-12-05

## 2018-12-05 RX ORDER — NALBUPHINE HYDROCHLORIDE 10 MG/ML
2.5-5 INJECTION, SOLUTION INTRAMUSCULAR; INTRAVENOUS; SUBCUTANEOUS EVERY 6 HOURS PRN
Status: DISCONTINUED | OUTPATIENT
Start: 2018-12-05 | End: 2018-12-05

## 2018-12-05 RX ORDER — SODIUM CHLORIDE, SODIUM LACTATE, POTASSIUM CHLORIDE, CALCIUM CHLORIDE 600; 310; 30; 20 MG/100ML; MG/100ML; MG/100ML; MG/100ML
INJECTION, SOLUTION INTRAVENOUS CONTINUOUS
Status: DISCONTINUED | OUTPATIENT
Start: 2018-12-05 | End: 2018-12-05

## 2018-12-05 RX ORDER — METHYLERGONOVINE MALEATE 0.2 MG/ML
200 INJECTION INTRAVENOUS
Status: DISCONTINUED | OUTPATIENT
Start: 2018-12-05 | End: 2018-12-05

## 2018-12-05 RX ORDER — HYDROCORTISONE 2.5 %
CREAM (GRAM) TOPICAL 3 TIMES DAILY PRN
Status: DISCONTINUED | OUTPATIENT
Start: 2018-12-05 | End: 2018-12-07 | Stop reason: HOSPADM

## 2018-12-05 RX ORDER — ONDANSETRON 2 MG/ML
4 INJECTION INTRAMUSCULAR; INTRAVENOUS EVERY 6 HOURS PRN
Status: DISCONTINUED | OUTPATIENT
Start: 2018-12-05 | End: 2018-12-05

## 2018-12-05 RX ORDER — EPHEDRINE SULFATE 50 MG/ML
INJECTION, SOLUTION INTRAMUSCULAR; INTRAVENOUS; SUBCUTANEOUS
Status: DISCONTINUED
Start: 2018-12-05 | End: 2018-12-05 | Stop reason: HOSPADM

## 2018-12-05 RX ORDER — OXYTOCIN/0.9 % SODIUM CHLORIDE 30/500 ML
100-340 PLASTIC BAG, INJECTION (ML) INTRAVENOUS CONTINUOUS PRN
Status: DISCONTINUED | OUTPATIENT
Start: 2018-12-05 | End: 2018-12-05

## 2018-12-05 RX ORDER — ACETAMINOPHEN 325 MG/1
650 TABLET ORAL EVERY 4 HOURS PRN
Status: DISCONTINUED | OUTPATIENT
Start: 2018-12-05 | End: 2018-12-07 | Stop reason: HOSPADM

## 2018-12-05 RX ORDER — OXYTOCIN/0.9 % SODIUM CHLORIDE 30/500 ML
1-24 PLASTIC BAG, INJECTION (ML) INTRAVENOUS CONTINUOUS
Status: DISCONTINUED | OUTPATIENT
Start: 2018-12-05 | End: 2018-12-05

## 2018-12-05 RX ORDER — CARBOPROST TROMETHAMINE 250 UG/ML
250 INJECTION, SOLUTION INTRAMUSCULAR
Status: DISCONTINUED | OUTPATIENT
Start: 2018-12-05 | End: 2018-12-05

## 2018-12-05 RX ORDER — EPHEDRINE SULFATE 50 MG/ML
5 INJECTION, SOLUTION INTRAMUSCULAR; INTRAVENOUS; SUBCUTANEOUS
Status: DISCONTINUED | OUTPATIENT
Start: 2018-12-05 | End: 2018-12-05

## 2018-12-05 RX ORDER — SODIUM CHLORIDE 9 MG/ML
INJECTION, SOLUTION INTRAVENOUS CONTINUOUS
Status: DISCONTINUED | OUTPATIENT
Start: 2018-12-05 | End: 2018-12-05

## 2018-12-05 RX ORDER — BUPIVACAINE HCL/0.9 % NACL/PF 0.125 %
PLASTIC BAG, INJECTION (ML) EPIDURAL
Status: COMPLETED
Start: 2018-12-05 | End: 2018-12-05

## 2018-12-05 RX ORDER — ACETAMINOPHEN 325 MG/1
650 TABLET ORAL EVERY 4 HOURS PRN
Status: DISCONTINUED | OUTPATIENT
Start: 2018-12-05 | End: 2018-12-05

## 2018-12-05 RX ORDER — OXYTOCIN/0.9 % SODIUM CHLORIDE 30/500 ML
100 PLASTIC BAG, INJECTION (ML) INTRAVENOUS CONTINUOUS
Status: DISCONTINUED | OUTPATIENT
Start: 2018-12-05 | End: 2018-12-07 | Stop reason: HOSPADM

## 2018-12-05 RX ORDER — NICOTINE POLACRILEX 4 MG
15-30 LOZENGE BUCCAL
Status: DISCONTINUED | OUTPATIENT
Start: 2018-12-05 | End: 2018-12-05

## 2018-12-05 RX ORDER — DEXTROSE MONOHYDRATE 25 G/50ML
25-50 INJECTION, SOLUTION INTRAVENOUS
Status: DISCONTINUED | OUTPATIENT
Start: 2018-12-05 | End: 2018-12-05

## 2018-12-05 RX ADMIN — ACETAMINOPHEN 650 MG: 325 TABLET, FILM COATED ORAL at 19:29

## 2018-12-05 RX ADMIN — Medication: at 10:50

## 2018-12-05 RX ADMIN — SODIUM CHLORIDE, POTASSIUM CHLORIDE, SODIUM LACTATE AND CALCIUM CHLORIDE: 600; 310; 30; 20 INJECTION, SOLUTION INTRAVENOUS at 08:49

## 2018-12-05 RX ADMIN — OXYTOCIN-SODIUM CHLORIDE 0.9% IV SOLN 30 UNIT/500ML 2 MILLI-UNITS/MIN: 30-0.9/5 SOLUTION at 08:49

## 2018-12-05 RX ADMIN — IBUPROFEN 800 MG: 800 TABLET ORAL at 12:24

## 2018-12-05 RX ADMIN — ACETAMINOPHEN 650 MG: 325 TABLET, FILM COATED ORAL at 14:28

## 2018-12-05 RX ADMIN — IBUPROFEN 800 MG: 800 TABLET ORAL at 19:29

## 2018-12-05 RX ADMIN — SODIUM CHLORIDE, SODIUM LACTATE, POTASSIUM CHLORIDE, CALCIUM CHLORIDE AND DEXTROSE MONOHYDRATE: 5; 600; 310; 30; 20 INJECTION, SOLUTION INTRAVENOUS at 11:09

## 2018-12-05 NOTE — L&D DELIVERY NOTE
OB Delivery Summary      HISTORY OF PRESENT ILLNESS:   with  IUP @ 37w6d who presented with for induction of labor secondary to chronic hypertension with super-imposed pre-eclampsia.      Her prenatal course was  complicated by:   1. Chronic HTN with superimposed pre-E (without severe features): on labetalol 100mg BID started at 37wkj. Proteinuria noted at 35w6d.   2. A2GDM: on glyburide 1.25mg qAM (normal early GCT, abnl 28wk GCT)  3. AMA: normal level II US    Prenatal labs were significant for blood type B+, rubella status immune.  Glucose challenge test 194 (3hr = 77/181/168/147).  Group beta strep culture was negative.  Estimated fetal weight on admission was approximately 9lb.        FIRST STAGE OF LABOR:  The patient was admitted to Labor and Delivery with a fetal heart rate tracing that was category I. Cervical exam was 2/70/-2 and amniotomy was performed at 8:30am, clear fluid noted. Pitocin was started and reached a maximum dose of 2mu/min. She received and epidural for pain control and quickly progressed in labor. She became completely dilated and noted pelvic pressure at 11:40am.      SECOND STAGE OF LABOR:  The patient began pushing at 11:46am.  She gradually brought the vertex down in the birth canal and delivered a viable male infant at 11:59am.  After delivery of the head, the anterior shoulder and body delivered without difficulty.  A loose nuchal cord was noted and reduced prior to delivery of the head. The infant was placed on the mother's chest.  Delayed cord clamping was performed.        THIRD STAGE OF LABOR:  The cord was clamped and cut. The placenta then delivered spontaneously and appeared intact with a 3-vessel cord at 12:04am.  Pitocin was started and fundal massage was performed.  Perineum was inspected and a small second degree laceration was noted. It was repaired with a running suture of 3-0 Vicryl. Quantitative blood loss for the delivery was 48cc.       Both mother and baby  tolerated delivery well and there were no complications. Fetal weight was 8lb1oz and Apgars were 8 and 9 at 1 and 5 minutes, respectively.       Jessica Trotter  12/5/2018  12:18 PM

## 2018-12-05 NOTE — IP AVS SNAPSHOT
MRN:[id number]                      After Visit Summary   12/5/2018    Mira Riddle    MRN: [id number]           Thank you!     Thank you for choosing [facility] for your care. Our goal is always to provide you with excellent care. Hearing back from our patients is one way we can continue to improve our services. Please take a few minutes to complete the written survey that you may receive in the mail after you visit. If you would like to speak to someone directly about your visit please contact Patient Relations at [phone]. Thank you!          Patient Information     Date Of Birth          1985        Designated Caregiver       Most Recent Value    Caregiver    Will someone help with your care after discharge? no      About your hospital stay     You were admitted on:  December 5, 2018 You last received care in the:  [facility]    You were discharged on:  December 7, 2018        Reason for your hospital stay       Maternity care                  Who to Call     For medical emergencies, please call 911.  For non-urgent questions about your medical care, please call your primary care provider or clinic, None          Attending Provider     Provider Specialty    Donna Jensen MD OB/Gyn    Jessica Trotter MD OB/Gyn       Primary Care Provider Fax #    [facility] [phone]      After Care Instructions     Activity       Review discharge instructions            Diet       Resume previous diet            Discharge Instructions - Postpartum visit       Schedule postpartum visit with your provider and return to clinic in one week.    Call your doctor if you develop a fever (temperature of 100.4 or higher), heavy bleeding, severe pain, or have other concerns.                  Further instructions from your care team       Preeclampsia   Call your doctor right away if you have any of the following:  - Edema (swelling) in your  face or hands  - Rapid weight gain-about 1 pound or more in a day  - Headache  - Abdominal pain on your right side  - Vision problems (flashes or spots)  - You have questions or concerns once you return home                              POST PARTUM VAGINAL DELIVERY INSTRUCTIONS, FOLLOW UP IN CLINIC NEXT Thursday 12/13 OR Friday 12/14 for BP check.  Activity       Ask family and friends for help when you need it.    Do not place anything in your vagina for 6 weeks.    You are not restricted on other activities, but take it easy for a few weeks to allow your body to recover from delivery.  You are able to do any activities you feel up to that point.    No driving until you have stopped taking your pain medications (usually two weeks after delivery).     Call your health care provider if you have any of these symptoms:       Increased pain, swelling, redness, or fluid around your stiches from an episiotomy or perineal tear.    A fever above 100.4 F (38 C) with or without chills when placing a thermometer under your tongue.    You soak a sanitary pad with blood within 1 hour, or you see blood clots larger than a golf ball.    Bleeding that lasts more than 6 weeks.    Vaginal discharge that smells bad.    Severe pain, cramping or tenderness in your lower belly area.    A need to urinate more frequently (use the toilet more often), more urgently (use the toilet very quickly), or it burns when you urinate.    Nausea and vomiting.    Redness, swelling or pain around a vein in your leg.    Problems breastfeeding or a red or painful area on your breast.    Chest pain and cough or are gasping for air.    Problems coping with sadness, anxiety, or depression.  If you have any concerns about hurting yourself or the baby, call your provider immediately.     You have questions or concerns after you return home.     Keep your hands clean:  Always wash your hands before touching your perineal area and stitches.  This helps reduce  "your risk of infection.  If your hands aren't dirty, you may use an alcohol hand-rub to clean your hands. Keep your nails clean and short.        Pending Results     No orders found from 12/3/2018 to 12/6/2018.            Statement of Approval     Ordered          12/07/18 1216  I have reviewed and agree with all the recommendations and orders detailed in this document.  EFFECTIVE NOW     Approved and electronically signed by:  Mayelin Lima MD             Admission Information     Date & Time Provider Department Dept. Phone    12/5/2018 Jessica Trotter MD [facility] [phone]      Your Vitals Were     Blood Pressure Pulse Temperature Respirations Height Weight    124/78 76 98.2  F (36.8  C) (Oral) 18 1.613 m (5' 3.5\") 71.7 kg (158 lb)    Last Period BMI (Body Mass Index)                03/15/2018 27.55 kg/m2          MyChart Information     [employer] gives you secure access to your electronic health record. If you see a primary care provider, you can also send messages to your care team and make appointments. If you have questions, please call your primary care clinic.  If you do not have a primary care provider, please call [phone] and they will assist you.        Care EveryWhere ID     This is your Care EveryWhere ID. This could be used by other organizations to access your [place] medical records  [id number]        Equal Access to Services     DAT MICHEL : Hadii rogelio Phan, waaxda luguanakitoadaha, qaybta kaalpatric smith. So [facility] [phone].    ATENCIÓN: Si habla español, tiene a oropeza disposición servicios gratuitos de asistencia lingüística. Llame al [phone].    We comply with applicable federal civil rights laws and [place] laws. We do not discriminate on the basis of race, color, national origin, age, disability, sex, sexual orientation, or gender identity.               Review of your medicines      CONTINUE " these medicines which may have CHANGED, or have new prescriptions. If we are uncertain of the size of tablets/capsules you have at home, strength may be listed as something that might have changed.        Dose / Directions    labetalol 100 MG tablet   Commonly known as:  NORMODYNE   This may have changed:  how much to take   Used for:  Benign essential hypertension        Dose:  200 mg   Take 2 tablets (200 mg) by mouth 2 times daily   Quantity:  60 tablet   Refills:  1         CONTINUE these medicines which have NOT CHANGED        Dose / Directions    acetone urine test strip   Commonly known as:  KETOSTIX   Used for:  Gestational diabetes        Test once daily   Quantity:  25 each   Refills:  1       blood glucose monitoring lancets   Used for:  Gestational diabetes        Use to test blood sugar 4 times daily or as directed.   Quantity:  102 each   Refills:  3       blood glucose monitoring test strip   Commonly known as:  ACCU-CHEK GUIDE   Used for:  Gestational diabetes        Use to test blood sugar 4 times daily or as directed.   Quantity:  50 each   Refills:  6       GLYBURIDE PO        Dose:  1.25 mg   Take 1.25 mg by mouth daily (with breakfast)   Refills:  0       prenatal multivitamin w/iron 27-0.8 MG tablet   Indication:  Pregnancy        Dose:  1 tablet   Take 1 tablet by mouth daily   Refills:  0         STOP taking     BABY ASPIRIN PO           IRON SUPPLEMENT PO                Where to get your medicines      These medications were sent to [facility] [address] [place] [employer] - [facility] [address] [facility]  [address] [facility] [address]     Phone:  [phone]     labetalol 100 MG tablet                Protect others around you: Learn how to safely use, store and throw away your medicines at www.disposemymeds.org.             Medication List: This is a list of all your medications and when to take them. Check marks below indicate your daily home schedule. Keep this list as a reference.       Medications           Morning Afternoon Evening Bedtime As Needed    acetone urine test strip   Commonly known as:  KETOSTIX   Test once daily                                blood glucose monitoring lancets   Use to test blood sugar 4 times daily or as directed.                                blood glucose monitoring test strip   Commonly known as:  ACCU-CHEK GUIDE   Use to test blood sugar 4 times daily or as directed.                                GLYBURIDE PO   Take 1.25 mg by mouth daily (with breakfast)                                labetalol 100 MG tablet   Commonly known as:  NORMODYNE   Take 2 tablets (200 mg) by mouth 2 times daily   Last time this was given:  200 mg on 12/7/2018  7:46 AM                                prenatal multivitamin w/iron 27-0.8 MG tablet   Take 1 tablet by mouth daily

## 2018-12-05 NOTE — H&P
"OB Brief Admit H&P    No significant change in general health status based on examination of the patient, review of Nursing Admission Database and prenatal record.    Pt is a 32 year old  @ 37w6d who presented to L&D for IOL secondary to chronic hypertension with superimposed pre-eclampsia. Feeling occasional ctx. No LOF or VB. Active movement. Denies HA, vision changes, upper abdominal pain, CP/SOB.     Patient's prenatal course has been complicated by:   1. Chronic HTN with superimposed pre-E (without severe features): on labetalol 100mg BID started at 37wkj. Proteinuria noted at 35w6d.   2. A2GDM: on glyburide 1.25mg qAM (normal early GCT, abnl 28wk GCT)  3. AMA: normal level II US    Prenatal Labs:    Blood type B+  Rubella immune  , 3hr = 77/181/168/147  GBS negative    EFW: 9lb    /85  Temp 99.1  F (37.3  C) (Oral)  Ht 1.613 m (5' 3.5\")  Wt 71.7 kg (158 lb)  LMP 03/15/2018  BMI 27.55 kg/m2  EFM:  Baseline 140, moderate variability, + accels, no decels, Cat I  [place]: irregular, every 10min  SVE: 2/70%/-2  Membranes:  AROM 8:30am, clear fluid    Assessment:  32 year old  @ 37w6d admitted for chronic HTN with super-imposed pre-E    Plan:  1. Admit to labor and delivery   2. IOL: AROM now, will start Pitocin when IV in  3. CHTN/pre-E: Took 100mg PO labetalol this AM. BP mildly elevated. No signs of severe disease, no magnsesium indicated at this time. Check preE labs now.  4. GDM: Did not take glyburide this AM, monitor BG's per protocol, discussed insulin drip if needed  5. GBS negative  6. S/p Flu & TDAP vaccine this pregnancy    Jessica Trotter  2018  8:24 AM      "

## 2018-12-05 NOTE — ANESTHESIA PROCEDURE NOTES
Peripheral nerve/Neuraxial procedure note :        Assessment/Narrative  .  .  . Comments:  Pre-Procedure  Performed by Андрей Langley MD  Location: OB.      PreAnesthestic Checklist: patient identified, IV checked, risks and benefits discussed, informed consent obtained, monitors and equipment checked, pre-op evaluation and at physician/surgeon's request.    Timeout   Correct Patient: Yes  Correct Procedure: Epidural catheter placement  Correct Site: Yes   Correct Position: Yes    Procedure Documentation  Procedure:   Epidural catheter block for Labor    Patient currently in labor and she and OBMD request a labor epidural to control her labor pains. Patient was interviewed and examined. Procedure and risks including but not limited to bleeding, infection, nerve injury, paralysis, PDPH, and inadequate block requiring intervention discussed with patient. Questions answered. This epidural is to be placed in anticipation of vaginal delivery.  She consents to the epidural procedure.  Time-out was performed.  I or my partners remain immediately available for management of any issues or complications and will monitor at appropriate intervals.  Procedure: Patient sitting. Betadine prep x 3. Sterile drape applied.  Lidocaine 1%  local infiltration at L 3-4.  17 G. Tuohy needle at L3-4 by loss of resistance into epidural space.  No CSF, paresthesia or blood. 1.5 % Lidocaine with 1:200,000 Epinephrine 5cc test dose. Then 0.25% bupivicaine 10 cc with NS 5 cc.  Epidural catheter inserted w/o resistance to 5 cm in epidural space.  Aspiration negative for blood and CSF.   Negative for neuro change, paresthesia or symptoms of intravascular injection or intrathecal injection.  Infusion orders written and infusion of 0.125% bupivicaine 15cc per hour started.    Андрей Langley MD

## 2018-12-05 NOTE — IP AVS SNAPSHOT
[facility]    [address]    [facility] [address]    Phone:  [phone]    Fax:  [phone]                                       After Visit Summary   12/5/2018    Mira Riddle    MRN: [id number]           After Visit Summary Signature Page     I have received my discharge instructions, and my questions have been answered. I have discussed any challenges I see with this plan with the nurse or doctor.    ..........................................................................................................................................  Patient/Patient Representative Signature      ..........................................................................................................................................  Patient Representative Print Name and Relationship to Patient    ..................................................               ................................................  Date                                   Time    ..........................................................................................................................................  Reviewed by Signature/Title    ...................................................              ..............................................  Date                                               Time          22EPIC Rev 08/18

## 2018-12-05 NOTE — PLAN OF CARE
Problem: Patient Care Overview  Goal: Plan of Care/Patient Progress Review   @ 37 6/7 weeks here for IOL for CHTN with superimposed Preeclampsia, IDGM controlled with Glyburide.  Patient states she has felt baby moving and denies LOF or VB.  VTX by [place] - EFM applied & explained.  See Doc flow sheets for assessment.  MD notified and orders received.  Labs were ordered & reviewed.  Patient progressed to complete with AROM & Pitocin & delivered at 1159 boy with apgars 8 & 9.

## 2018-12-06 LAB
GLUCOSE BLDC GLUCOMTR-MCNC: 71 MG/DL (ref 70–99)
HGB BLD-MCNC: 10.2 G/DL (ref 11.7–15.7)
T PALLIDUM AB SER QL: NONREACTIVE

## 2018-12-06 PROCEDURE — 25000132 ZZH RX MED GY IP 250 OP 250 PS 637: Performed by: OBSTETRICS & GYNECOLOGY

## 2018-12-06 PROCEDURE — 12000029 ZZH R&B OB INTERMEDIATE

## 2018-12-06 PROCEDURE — 00000146 ZZHCL STATISTIC GLUCOSE BY METER IP

## 2018-12-06 PROCEDURE — 85018 HEMOGLOBIN: CPT | Performed by: OBSTETRICS & GYNECOLOGY

## 2018-12-06 PROCEDURE — 36415 COLL VENOUS BLD VENIPUNCTURE: CPT | Performed by: OBSTETRICS & GYNECOLOGY

## 2018-12-06 RX ORDER — LABETALOL 200 MG/1
200 TABLET, FILM COATED ORAL EVERY 12 HOURS SCHEDULED
Status: DISCONTINUED | OUTPATIENT
Start: 2018-12-06 | End: 2018-12-07 | Stop reason: HOSPADM

## 2018-12-06 RX ADMIN — SENNOSIDES AND DOCUSATE SODIUM 1 TABLET: 8.6; 5 TABLET ORAL at 01:15

## 2018-12-06 RX ADMIN — LABETALOL HYDROCHLORIDE 100 MG: 100 TABLET, FILM COATED ORAL at 08:52

## 2018-12-06 RX ADMIN — ACETAMINOPHEN 650 MG: 325 TABLET, FILM COATED ORAL at 06:24

## 2018-12-06 RX ADMIN — IBUPROFEN 800 MG: 800 TABLET ORAL at 10:23

## 2018-12-06 RX ADMIN — ACETAMINOPHEN 650 MG: 325 TABLET, FILM COATED ORAL at 00:56

## 2018-12-06 RX ADMIN — IBUPROFEN 800 MG: 800 TABLET ORAL at 00:57

## 2018-12-06 RX ADMIN — ACETAMINOPHEN 650 MG: 325 TABLET, FILM COATED ORAL at 10:23

## 2018-12-06 RX ADMIN — LABETALOL HYDROCHLORIDE 200 MG: 200 TABLET, FILM COATED ORAL at 17:31

## 2018-12-06 RX ADMIN — IBUPROFEN 800 MG: 800 TABLET ORAL at 16:56

## 2018-12-06 ASSESSMENT — ACTIVITIES OF DAILY LIVING (ADL)
SWALLOWING: 0-->SWALLOWS FOODS/LIQUIDS WITHOUT DIFFICULTY
RETIRED_EATING: 0-->INDEPENDENT
DRESS: 0-->INDEPENDENT
RETIRED_COMMUNICATION: 0-->UNDERSTANDS/COMMUNICATES WITHOUT DIFFICULTY
TRANSFERRING: 0-->INDEPENDENT
AMBULATION: 0-->INDEPENDENT
BATHING: 0-->INDEPENDENT
FALL_HISTORY_WITHIN_LAST_SIX_MONTHS: NO
TOILETING: 0-->INDEPENDENT
COGNITION: 0 - NO COGNITION ISSUES REPORTED

## 2018-12-06 NOTE — PROGRESS NOTES
"OB Post-partum Note  PPD# 1    S:  Patient doing well.  Pain controlled.  Voiding.  Bleeding normal.  Pumping - baby in NICU with pneumothorax, hypoglycemia, and feeding issues.  Denies HA, blurry vision, RUQ pain    O:  /89  Pulse 79  Temp 97.9  F (36.6  C) (Oral)  Resp 18  Ht 1.613 m (5' 3.5\")  Wt 71.7 kg (158 lb)  LMP 03/15/2018  Breastfeeding? Unknown  BMI 27.55 kg/m2  Gen- A&O, NAD  Abd- Non-tender, fundus firm at umbilicus  Ext- non-tender, trace edema    Hemoglobin   Date Value Ref Range Status   2018 10.2 (L) 11.7 - 15.7 g/dL Final     B+  Rubella Immune    BS - 67, 84, 71    A/P: 32 year old  PPD# 1 s/p , IOL for superimposed preeclampsia. GDMA1.    1.  Routine post-partum cares.  2.  CHTN with Superimposed preeclampsia - plan to restart labetalol 100 mg p.o. BID this morning due to persistently elevated blood pressures since delivery. No other s/stx PP preeclampsia. Will anticipate patient needs to follow up 1 week after discharge for bp check, titrate labetalol as appropriate.  3.. GDMA1 - BS normal, no need for further monitoring  4.  Anticipate d/c home on PPD# 2.    Leatha Devlin MD  2018  11:04 AM  "

## 2018-12-06 NOTE — PROVIDER NOTIFICATION
Called Dr. Lima to clarify medication orders for BP control. Dr. Lima was in OR, talked to the nurse and passed the message.

## 2018-12-06 NOTE — PROVIDER NOTIFICATION
12/06/18 1722   Provider Notification   Provider Name/Title Dr. Trotter   Method of Notification Phone   Request Evaluate-Remote   Notification Reason Medication Request;Vital Signs Change   MD updated on B/P see flowsheet. Ordered oral labetalol increase 200 mg BID and give next dose now and then again tomorrow morning. Take B/P one hour after dose and every 4 hours if in a good range.

## 2018-12-06 NOTE — PLAN OF CARE
Problem: Patient Care Overview  Goal: Plan of Care/Patient Progress Review  Outcome: Improving  Patient to NICU x1 via wheel chair. VS and Fundal assessments WNL. Patient taking ibuprofen and tylenol for cramping and is coping with that. Declines pumping at this time, but will later if unable to feed baby. Up and caring for self independently. Declines any headache, blurred vision, URQ pain. BPs slightly elevated, but stable. Will continue to monitor and update as needed.

## 2018-12-06 NOTE — PLAN OF CARE
Problem: Patient Care Overview  Goal: Plan of Care/Patient Progress Review  Outcome: Improving  Patient is stable and afebrile. Pain is controlled with Tylenol and Ibuprofen. Breastfeeding, started pumping, education is provided. BP is monitored, little elevated, asymptomatic, hx of preeclampsia. Received new order of labetalol starting this morning at 0800. Continue to monitor and assist per pt care plan.

## 2018-12-06 NOTE — CONSULTS
Note copied from baby's NICU chart.  [facility]  MATERNAL CHILD HEALTH   INITIAL NICU PSYCHOSOCIAL ASSESSMENT     DATA:     Reason for Social Work Consult: Baby in NICU at 37.6 weeks for hypoglycemia and pneumothorax.    Presenting Information: Axel is  son of Francisca who are  and reside in [place] with their daughters Gladys Patterson and Nisa barksdale.   Social Support: Their families reside nearby and are very supportive    Employment: Mira will take 12 weeks off work and Eric has a week off and will be able to work from home part time.       Mental Health History: None    History of Postpartum Mood Disorders: None    Chemical Health History: None    Current Coping: Well    Community Resources//Baby Supplies: Couple is prepared for baby at home and are not on WIC.    INTERVENTION:       SW completed chart review and collaborated with the multidisciplinary team.     Psychosocial Assessment     Introduction to Maternal Child Health  role and scope of practice     Discussed NICU experience and gave NICU welcome card    Reviewed Hospital and Community Resources     Assessed Chemical Health History and Current Symptoms     Assessed Mental Health History and Current Symptoms     Identified stressors, barriers and family concerns     Provided support and active empathetic listening and validation.     Provided psychoeducation on  mood and anxiety disorders, assessed for any current symptoms or history    Provided brochure Depression and Anxiety During and after Pregnancy.     ASSESSMENT:     Coping: very well    Affect: appropriate with good eye contact.  Motivation/Ability to Access Services: Highly motivated, independent in accessing services.  Assessment of Support System: stable and involved.    Level of engagement with SW: Engaged and appropriate. Able to seek out SW when needs arise.     Family s understanding of baby s medical situation: appropriate  understanding, limited understanding with good grasp of the medical situation.    Family and parent/infant interactions: Parents seem supportive of each other and speak very attentively of  son.    Assessment of parental risk for PMAD: None      PLAN:     SW will continue to follow throughout pt's Maternal-Child Health Journey as needs arise. SW will continue to collaborate with the multidisciplinary team.

## 2018-12-06 NOTE — PLAN OF CARE
Problem: Patient Care Overview  Goal: Plan of Care/Patient Progress Review  Blood pressures elevated throughout shift. Blood pressures increasing this morning prior to labetalol dose, remain elevated but stable after labetalol administration (see flowsheets). Reflexes +2 bilaterally, no clonus. Patient denies visual changes, headache, and epigastric pain. Patient up ad aiden, voiding without difficulty. Pain controlled with use of oral pain medications. Patient taking wheelchair to NICU to see infant. Utilizing breast pump about every 3 hours.

## 2018-12-06 NOTE — ANESTHESIA POSTPROCEDURE EVALUATION
Patient: Mira Riddle    * No procedures listed *    Diagnosis:* No pre-op diagnosis entered *  Diagnosis Additional Information: Labor pain    Anesthesia Type:  Epidural    Note:  Anesthesia Post Evaluation         Comments:     S/P epidural for labor.   I or my partner was immediately available for management of this patient during epidural analgesia infusion.  VSS.  Doing well. Block resolved.  Neuro at baseline. Denies positional headache. Minimal side effects easily managed w/ PRN meds. No apparent anesthetic complications. No follow-up required.    Андрей Langley MD          Last vitals:  Vitals:    12/06/18 0600 12/06/18 0900 12/06/18 1017   BP: (!) 150/101 (!) 158/102 150/89   Pulse: 118 120 79   Resp: 18 18    Temp: 97.6  F (36.4  C) 97.9  F (36.6  C)          Electronically Signed By: Андрей Langley MD  December 6, 2018  10:42 AM

## 2018-12-07 VITALS
WEIGHT: 158 LBS | BODY MASS INDEX: 26.98 KG/M2 | TEMPERATURE: 98.2 F | SYSTOLIC BLOOD PRESSURE: 124 MMHG | RESPIRATION RATE: 18 BRPM | HEART RATE: 76 BPM | HEIGHT: 64 IN | DIASTOLIC BLOOD PRESSURE: 78 MMHG

## 2018-12-07 PROCEDURE — 25000132 ZZH RX MED GY IP 250 OP 250 PS 637: Performed by: OBSTETRICS & GYNECOLOGY

## 2018-12-07 RX ORDER — LABETALOL 100 MG/1
200 TABLET, FILM COATED ORAL 2 TIMES DAILY
Qty: 60 TABLET | Refills: 1 | Status: SHIPPED | OUTPATIENT
Start: 2018-12-07

## 2018-12-07 RX ADMIN — IBUPROFEN 800 MG: 800 TABLET ORAL at 07:52

## 2018-12-07 RX ADMIN — LABETALOL HYDROCHLORIDE 200 MG: 200 TABLET, FILM COATED ORAL at 07:46

## 2018-12-07 RX ADMIN — SENNOSIDES AND DOCUSATE SODIUM 1 TABLET: 8.6; 5 TABLET ORAL at 07:46

## 2018-12-07 NOTE — PLAN OF CARE
Problem: Patient Care Overview  Goal: Plan of Care/Patient Progress Review  Outcome: Improving  Labetalol given this morning, BP prior to labetalol 158/102, subsequent BP readings 141/88 and 124/78, patient denies any PIH symptoms, has been down to NICU to see baby. Possible discharge later today.    BP readings reviewed by MD, may discharge to home today, RTC in one week for BP check, labetalol sent to patients own pharmacy, discharge instructions reviewed, all questions answered. Declined bed and board, papers signed. Ready to leave unit at 13.30.

## 2018-12-07 NOTE — PROGRESS NOTES
Post-partum Note    Name:  Mira Riddle  MRN: [id number]    S: Patient is doing well.  Pain is minimal.  Tolerating regular diet without nausea or vomiting.  Ambulating without dizziness.  Lochia minimal.  Pumping for baby. Baby in NICU but hoping for only a few days.  No HA, vision changes, RUQ pain.    O:   Patient Vitals for the past 24 hrs:   BP Temp Temp src Pulse Resp   18 0818 141/88 - - - -   18 0743 (!) 158/102 98.2  F (36.8  C) Oral 76 18   18 0335 138/74 - - - -   18 2300 143/79 97.9  F (36.6  C) Oral 76 16   18 2034 142/81 - - - -   18 1841 150/88 - - 79 -   18 1715 (!) 157/101 - - 76 -   18 1656 (!) 171/100 97.9  F (36.6  C) Oral 79 -   18 1203 135/74 - - 72 -   18 1017 150/89 - - 79 -   18 0900 (!) 158/102 97.9  F (36.6  C) Oral 120 18     Gen:  Resting comfortably, NAD  Pulm:  Breathing comfortably on room air  Abd:  Soft, appropriately ttp, non-distended.Fundus at 1cm below umbilicus, firm and non-tender.  Ext:  non-tender, no bilateral LE edema    Hgb:   Hemoglobin   Date Value Ref Range Status   2018 10.2 (L) 11.7 - 15.7 g/dL Final       Assessment/Plan:  32 year old  on PPD #1 s/p  after IOL for CHTN with superimposed pre-eclampsia. Also with GDMA2.  1. Continue with routine postpartum management  2. CHTN with superimposed pre-eclampsia. Labetalol increased to 200 mg BID last night. Will monitor BPs today, if in acceptable range will plan for discharge this afternoon.  3. GDMA2: plan GTT at 6 week follow-up.  Hgb >10; iron not indicated. No signs or symptoms of acute blood loss anemia.   4. Rh: Positive  5. Feed: Pumping for baby in NICU  Dispo: DC home likely today, pending BP control. Follow-up within one week for BP check. Needs GTT at 6 postpartum visit.     Mayelin Lima MD  [facility]  2018, 8:36 AM

## 2018-12-07 NOTE — PLAN OF CARE
Problem: Patient Care Overview  Goal: Plan of Care/Patient Progress Review  Outcome: Improving  Pt stable vitals WNL, ex elevated B/P see flow sheet and provider update. Declined preeclampsia s/sx. Pt pumping and sending EBM down to . Pt ambulating and voiding ind.

## 2018-12-07 NOTE — LACTATION NOTE
This note was copied from a baby's chart.  LC spoke with parents in the NICU. Reviewed pumping strategies. Axel is not at breast due to resp issues. Encouraged STS as possible. Breast feeding hx is WNL for milk production. Reviewed pump for home use. I suggested pump they have from previous baby may be acceptable. No indications for hospital grade pump at this time. Binder given for hands free pumping.   Will continue to follow and support.

## 2018-12-07 NOTE — PROGRESS NOTES
SPIRITUAL HEALTH SERVICES Progress Note  [facility]    Introduced parents, Mira and Gumaro to Spiritual Health Services.     Plan: Spiritual Health Services remains available for additional emotional/spiritual support.    Ugo Redmond MA  Staff   Pager: [phone]  Phone: [phone]

## 2018-12-07 NOTE — PROGRESS NOTES
Blood pressures reviewed after labetalol dose this mornin/88 and 124/78. These are within goal range. Will place discharge orders with plan for follow-up within one week.    Mayelin Lima MD   [facility]  2018 12:17 PM

## 2018-12-07 NOTE — DISCHARGE INSTRUCTIONS
Preeclampsia   Call your doctor right away if you have any of the following:  - Edema (swelling) in your face or hands  - Rapid weight gain-about 1 pound or more in a day  - Headache  - Abdominal pain on your right side  - Vision problems (flashes or spots)  - You have questions or concerns once you return home                              POST PARTUM VAGINAL DELIVERY INSTRUCTIONS, FOLLOW UP IN CLINIC NEXT Thursday 12/13 OR Friday 12/14 for BP check.  Activity       Ask family and friends for help when you need it.    Do not place anything in your vagina for 6 weeks.    You are not restricted on other activities, but take it easy for a few weeks to allow your body to recover from delivery.  You are able to do any activities you feel up to that point.    No driving until you have stopped taking your pain medications (usually two weeks after delivery).     Call your health care provider if you have any of these symptoms:       Increased pain, swelling, redness, or fluid around your stiches from an episiotomy or perineal tear.    A fever above 100.4 F (38 C) with or without chills when placing a thermometer under your tongue.    You soak a sanitary pad with blood within 1 hour, or you see blood clots larger than a golf ball.    Bleeding that lasts more than 6 weeks.    Vaginal discharge that smells bad.    Severe pain, cramping or tenderness in your lower belly area.    A need to urinate more frequently (use the toilet more often), more urgently (use the toilet very quickly), or it burns when you urinate.    Nausea and vomiting.    Redness, swelling or pain around a vein in your leg.    Problems breastfeeding or a red or painful area on your breast.    Chest pain and cough or are gasping for air.    Problems coping with sadness, anxiety, or depression.  If you have any concerns about hurting yourself or the baby, call your provider immediately.     You have questions or concerns after you return home.     Keep your  hands clean:  Always wash your hands before touching your perineal area and stitches.  This helps reduce your risk of infection.  If your hands aren't dirty, you may use an alcohol hand-rub to clean your hands. Keep your nails clean and short.

## 2018-12-07 NOTE — PLAN OF CARE
Problem: Patient Care Overview  Goal: Plan of Care/Patient Progress Review  Outcome: Improving  Patient meeting expected outcomes. BP's continue to be slightly elevated. Pumping every 4 hours overnight for baby in NICU. Declined pain medication this shift. Independent with self cares. Anticipate discharge today.

## 2018-12-11 ENCOUNTER — LACTATION ENCOUNTER (OUTPATIENT)
Age: 33
End: 2018-12-11

## 2018-12-11 NOTE — LACTATION NOTE
This note was copied from a baby's chart.  LC assisting with breast feeding. Axel is on RA 1/2LPM N/C.  Feedings: Axel fussy, anxious to feed. Immediate latch with nipple shield. Mom already leaking milk. Latch appropriate, audible swallow, managing flow but desats to 84%. Removed from breast to allow to recover. Re-latched easily and once again desat to 84%. Unlatched to allow to recover. Encouraged Mira to monitor babe's breathing and give stimulation when he continues to suck/swallow and not taking breaths. I increased fiO2 to 23% and desats no longer occurred. Axel's respirations also became more normal  in his s/s/b pattern. 46mL per scale  Pumping: Reviewed pumping strategies. Her milk volume is appropriate. I encouraged she can take one 4 hr pumping break at night for additional sleep.  Plan:Continue to follow and support and monitor milk volumes

## 2018-12-12 ENCOUNTER — LACTATION ENCOUNTER (OUTPATIENT)
Age: 33
End: 2018-12-12

## 2018-12-12 NOTE — LACTATION NOTE
This note was copied from a baby's chart.  ASHLEY observing breast feeding in progress.  Feedings: Axel at breast without nipple shield. Latch appropriate with audible swallowing. Mother reports he had one time of desat with let down and she removed him from breast to recover.  Pumping: Mira is pumping ~100mL every 3 hours. Did have 4 hr gap at night. She reports breast discomfort at 4 hr interval.  Pumping time is 30-35 min. Discussed reducing pumping time to 25-30 min. And monitor milk volume. Mira agreed with the plan.  Plan: Will continue to monitor pumping routine and decrease as tolerated

## 2018-12-14 ENCOUNTER — LACTATION ENCOUNTER (OUTPATIENT)
Age: 33
End: 2018-12-14

## 2018-12-14 NOTE — LACTATION NOTE
This note was copied from a baby's chart.  LC spoke with parents in the NICU. Plan for discharge tomorrow.   Discharge handouts reviewed for home storage of breast milk, thawing and warming milk, birth control, OTC and Rx medications, weaning from pumping, breast feeding resources on the Internet. We discussed a feeding plan. Parents acknowledge comfort managing cues and feedings. 2 siblings at home that were breast fed. Mira has a Medela pump n style and has no concerns with pumping. We discussed her pumping strategies and she will begin to decrease her frequency to 3 1/2 hr as she experiences discomfort a 4 hr. We discussed managing pumping as she returns to work as she has lost her milk previously when returning to work.  I gave my card for OP follow and support as needed.

## 2020-03-02 ENCOUNTER — HEALTH MAINTENANCE LETTER (OUTPATIENT)
Age: 35
End: 2020-03-02

## 2020-12-14 ENCOUNTER — HEALTH MAINTENANCE LETTER (OUTPATIENT)
Age: 35
End: 2020-12-14

## 2021-04-18 ENCOUNTER — HEALTH MAINTENANCE LETTER (OUTPATIENT)
Age: 36
End: 2021-04-18

## 2021-10-02 ENCOUNTER — HEALTH MAINTENANCE LETTER (OUTPATIENT)
Age: 36
End: 2021-10-02

## 2022-05-14 ENCOUNTER — HEALTH MAINTENANCE LETTER (OUTPATIENT)
Age: 37
End: 2022-05-14

## 2022-09-03 ENCOUNTER — HEALTH MAINTENANCE LETTER (OUTPATIENT)
Age: 37
End: 2022-09-03

## 2022-10-26 NOTE — PROGRESS NOTES
This is a recent snapshot of the patient's [facility] medical record.  For current drug dose and complete information and questions, call [phone] or In Basket pool, [facility] home infusion ([id number])  CSN Number:  [id number]       no

## 2022-12-30 NOTE — PLAN OF CARE
[facility]: [facility].  Reason for maternal/fetal assessment is scheduled BPP for preeclampsia. Patient reports that she was told on Wednesday she had preeclampsia, which she had with prior pregnancy.  Patient is a .  Prenatal record reviewed. Pregnancy  has been complicated by hyperemesis gravidarum, gestational diabetes, diet controlled and preeclampsia.  Gestational Age 36w2d. VSS. Fetal movement present. Patient denies leaking of vaginal fluid/rupture of membranes, vaginal bleeding. Support person is present.   Action: Verbal consent for EFM. Triage assessment completed. Bill of rights reviewed.  Response: Patient verbalized agreement with plan. Will contact Dr Jessica Trotter with update and further orders.   Pt. States that she fell earlier today about an hour ago. Pt. States she is not sure why she fell, however she hit her head on the garbage can when she fell. Pt. States that she had just gotten back from having her INR drawn.

## 2023-06-03 ENCOUNTER — HEALTH MAINTENANCE LETTER (OUTPATIENT)
Age: 38
End: 2023-06-03

## 2024-02-17 ENCOUNTER — HEALTH MAINTENANCE LETTER (OUTPATIENT)
Age: 39
End: 2024-02-17